# Patient Record
Sex: FEMALE | Race: WHITE | Employment: UNEMPLOYED | URBAN - METROPOLITAN AREA
[De-identification: names, ages, dates, MRNs, and addresses within clinical notes are randomized per-mention and may not be internally consistent; named-entity substitution may affect disease eponyms.]

---

## 2020-11-25 ENCOUNTER — HOSPITAL ENCOUNTER (INPATIENT)
Age: 35
LOS: 8 days | Discharge: HOME | DRG: 885 | End: 2020-12-03
Attending: PSYCHIATRY & NEUROLOGY | Admitting: PSYCHIATRY & NEUROLOGY

## 2020-11-25 PROBLEM — F32.9 MDD (MAJOR DEPRESSIVE DISORDER): Status: ACTIVE | Noted: 2020-11-25

## 2020-11-25 PROCEDURE — 74011250636 HC RX REV CODE- 250/636: Performed by: PSYCHIATRY & NEUROLOGY

## 2020-11-25 PROCEDURE — 94660 CPAP INITIATION&MGMT: CPT

## 2020-11-25 PROCEDURE — 74011250637 HC RX REV CODE- 250/637: Performed by: PSYCHIATRY & NEUROLOGY

## 2020-11-25 PROCEDURE — 65220000003 HC RM SEMIPRIVATE PSYCH

## 2020-11-25 PROCEDURE — 74011250637 HC RX REV CODE- 250/637: Performed by: NURSE PRACTITIONER

## 2020-11-25 RX ORDER — OLANZAPINE 10 MG/1
10 TABLET ORAL
COMMUNITY

## 2020-11-25 RX ORDER — TESTOSTERONE CYPIONATE 200 MG/ML
50 INJECTION INTRAMUSCULAR
Status: DISCONTINUED | OUTPATIENT
Start: 2020-11-25 | End: 2020-11-25

## 2020-11-25 RX ORDER — DIVALPROEX SODIUM 500 MG/1
1000 TABLET, EXTENDED RELEASE ORAL
Status: DISCONTINUED | OUTPATIENT
Start: 2020-11-25 | End: 2020-11-25

## 2020-11-25 RX ORDER — DICYCLOMINE HYDROCHLORIDE 10 MG/1
10 CAPSULE ORAL 4 TIMES DAILY
COMMUNITY
End: 2020-12-03

## 2020-11-25 RX ORDER — TRAZODONE HYDROCHLORIDE 50 MG/1
50 TABLET ORAL
Status: DISCONTINUED | OUTPATIENT
Start: 2020-11-25 | End: 2020-12-03 | Stop reason: HOSPADM

## 2020-11-25 RX ORDER — TIZANIDINE 2 MG/1
2 TABLET ORAL 3 TIMES DAILY
COMMUNITY
End: 2020-12-03

## 2020-11-25 RX ORDER — BENZTROPINE MESYLATE 1 MG/1
1 TABLET ORAL
Status: DISCONTINUED | OUTPATIENT
Start: 2020-11-25 | End: 2020-12-03 | Stop reason: HOSPADM

## 2020-11-25 RX ORDER — METOPROLOL TARTRATE 25 MG/1
25 TABLET, FILM COATED ORAL 2 TIMES DAILY
Status: DISCONTINUED | OUTPATIENT
Start: 2020-11-25 | End: 2020-12-01

## 2020-11-25 RX ORDER — DIPHENHYDRAMINE HYDROCHLORIDE 50 MG/ML
50 INJECTION, SOLUTION INTRAMUSCULAR; INTRAVENOUS
Status: DISCONTINUED | OUTPATIENT
Start: 2020-11-25 | End: 2020-12-03 | Stop reason: HOSPADM

## 2020-11-25 RX ORDER — ALBUTEROL SULFATE 90 UG/1
2 AEROSOL, METERED RESPIRATORY (INHALATION)
COMMUNITY

## 2020-11-25 RX ORDER — HYDROXYZINE 50 MG/1
50 TABLET, FILM COATED ORAL
Status: ON HOLD | COMMUNITY
End: 2020-12-03 | Stop reason: SDUPTHER

## 2020-11-25 RX ORDER — ALBUTEROL SULFATE 90 UG/1
2 AEROSOL, METERED RESPIRATORY (INHALATION)
Status: DISCONTINUED | OUTPATIENT
Start: 2020-11-25 | End: 2020-12-03 | Stop reason: HOSPADM

## 2020-11-25 RX ORDER — PANTOPRAZOLE SODIUM 40 MG/1
40 TABLET, DELAYED RELEASE ORAL DAILY
Status: DISCONTINUED | OUTPATIENT
Start: 2020-11-25 | End: 2020-12-03 | Stop reason: HOSPADM

## 2020-11-25 RX ORDER — MOMETASONE FUROATE 1 MG/G
CREAM TOPICAL DAILY
COMMUNITY
End: 2020-12-03

## 2020-11-25 RX ORDER — DIVALPROEX SODIUM 500 MG/1
500 TABLET, DELAYED RELEASE ORAL 2 TIMES DAILY
COMMUNITY
Start: 2020-11-04 | End: 2020-12-03

## 2020-11-25 RX ORDER — DIVALPROEX SODIUM 500 MG/1
2000 TABLET, EXTENDED RELEASE ORAL
Status: DISCONTINUED | OUTPATIENT
Start: 2020-11-25 | End: 2020-11-30

## 2020-11-25 RX ORDER — TESTOSTERONE CYPIONATE 200 MG/ML
100 INJECTION INTRAMUSCULAR
Status: DISCONTINUED | OUTPATIENT
Start: 2020-11-25 | End: 2020-12-03 | Stop reason: HOSPADM

## 2020-11-25 RX ORDER — TESTOSTERONE CYPIONATE 200 MG/ML
100 INJECTION INTRAMUSCULAR
COMMUNITY

## 2020-11-25 RX ORDER — FLUTICASONE PROPIONATE 50 MCG
2 SPRAY, SUSPENSION (ML) NASAL DAILY
COMMUNITY

## 2020-11-25 RX ORDER — ACETAMINOPHEN 325 MG/1
650 TABLET ORAL
Status: DISCONTINUED | OUTPATIENT
Start: 2020-11-25 | End: 2020-12-03 | Stop reason: HOSPADM

## 2020-11-25 RX ORDER — LORAZEPAM 2 MG/ML
1 INJECTION INTRAMUSCULAR
Status: DISCONTINUED | OUTPATIENT
Start: 2020-11-25 | End: 2020-12-03 | Stop reason: HOSPADM

## 2020-11-25 RX ORDER — HYDROXYZINE 50 MG/1
50 TABLET, FILM COATED ORAL
Status: DISCONTINUED | OUTPATIENT
Start: 2020-11-25 | End: 2020-12-03 | Stop reason: HOSPADM

## 2020-11-25 RX ORDER — FLUTICASONE PROPIONATE 50 MCG
2 SPRAY, SUSPENSION (ML) NASAL DAILY
Status: DISCONTINUED | OUTPATIENT
Start: 2020-11-25 | End: 2020-12-03 | Stop reason: HOSPADM

## 2020-11-25 RX ORDER — PANTOPRAZOLE SODIUM 40 MG/1
40 TABLET, DELAYED RELEASE ORAL DAILY
COMMUNITY

## 2020-11-25 RX ORDER — ADHESIVE BANDAGE
30 BANDAGE TOPICAL DAILY PRN
Status: DISCONTINUED | OUTPATIENT
Start: 2020-11-25 | End: 2020-12-03 | Stop reason: HOSPADM

## 2020-11-25 RX ORDER — OLANZAPINE 5 MG/1
5 TABLET ORAL
Status: DISCONTINUED | OUTPATIENT
Start: 2020-11-25 | End: 2020-11-28

## 2020-11-25 RX ORDER — METOPROLOL TARTRATE 25 MG/1
25 TABLET, FILM COATED ORAL 2 TIMES DAILY
COMMUNITY

## 2020-11-25 RX ADMIN — TESTOSTERONE CYPIONATE 100 MG: 200 INJECTION, SOLUTION INTRAMUSCULAR at 13:57

## 2020-11-25 RX ADMIN — METOPROLOL TARTRATE 25 MG: 25 TABLET, FILM COATED ORAL at 17:56

## 2020-11-25 RX ADMIN — METOPROLOL TARTRATE 25 MG: 25 TABLET, FILM COATED ORAL at 10:48

## 2020-11-25 RX ADMIN — ACETAMINOPHEN 650 MG: 325 TABLET ORAL at 19:44

## 2020-11-25 RX ADMIN — PANTOPRAZOLE SODIUM 40 MG: 40 TABLET, DELAYED RELEASE ORAL at 10:49

## 2020-11-25 RX ADMIN — FLUTICASONE PROPIONATE 2 SPRAY: 50 SPRAY, METERED NASAL at 12:44

## 2020-11-25 RX ADMIN — DIVALPROEX SODIUM 2000 MG: 500 TABLET, EXTENDED RELEASE ORAL at 21:32

## 2020-11-25 NOTE — PROGRESS NOTES
Problem: Discharge Planning  Goal: *Discharge to safe environment  Outcome: Progressing Towards Goal  Note: Pt will discharge home when stable and follow-up with [facility]  Goal: *Knowledge of medication management  Outcome: Progressing Towards Goal  Note: Pt acknowledges understanding of all medications prescribed  Goal: *Knowledge of discharge instructions  Outcome: Progressing Towards Goal  Note: Pt acknowledges he will discharge home and follow-up with [facility]

## 2020-11-25 NOTE — BH NOTES
Pt received voluntarily via stretcher from [facility] ED to general unit  Pt admits to passive suicidal thoughts with no intent (hx of cutting, ODing, hanging). Pt denies HI and any psychotic symptoms. UDS +THC (denies all other drug use)  No ETOH or tobacco use noted. Skin assessment completed by Tonja Villatoro, ALBERT and Kirk Andre RN. Multiple scars on upper extremities from cutting. Scars on back and front from multiple surgeries. Multiple tattoos on body.       Blocked Bed Documentation:  Room number: 733  Type: Behavior  Rationale: Transgender  Anticipated duration: entire duration

## 2020-11-25 NOTE — PROGRESS NOTES
[facility] 60  Master Treatment Plan for Sho Hazard    Date Treatment Plan Initiated: 11/25/20    Treatment Plan Modalities:  Type of Modality Amount  (x minutes) Frequency (x/week) Duration (x days) Name of Responsible Staff   [address] [facility] meetings to encourage peer interactions 13 7 1 Jacquelin CABAN     Group psychotherapy to assist in building coping skills and internal controls 60 7 1 Nora Hernandez   Therapeutic activity groups to build coping skills 60 7 1 Nora Hernandez   Psychoeducation in group setting to address:   Medication education   15 7 [address] [employer] skills   30 3 1 Nora Hernandez   Relaxation techniques         Symptom management         Discharge planning   60 2 [address] [employer]    60 2 1 Chaplain CASTANEDA   61 1 1 volunteer   Recovery/AA/NA      volunteer   Physician medication management   13 7 1 Dr. Yesenia Muller meeting/discharge planning   15 2 1 Archie Woodward and Lucinda Meadows                                   Problem: Depressed Mood (Adult/Pediatric)  Goal: *STG: Participates in treatment plan  Outcome: Progressing Towards Goal  Note: Out on unit passively engaged. Mood and affect sad to anxious. Denies  SI with a plan describes feeling hopeless. Racing thoughts and chronic thoughts of cutting and self harming. Education provided on risks and drawback to daily thc. Medications reviewed and pt verbalized understanding. Social stressors and increase stress level related to his ex relationship.  Staff focus is on offering coping skills and medication education  Goal: *STG: Verbalizes anger, guilt, and other feelings in a constructive manor  Outcome: Progressing Towards Goal  Goal: *STG: Attends activities and groups  Outcome: Progressing Towards Goal  Goal: *STG: Demonstrates reduction in symptoms and increase in insight into coping skills/future focused  Outcome: Progressing Towards Goal  Goal: Interventions  Outcome: Progressing Towards Goal

## 2020-11-25 NOTE — BH NOTES
GROUP THERAPY PROGRESS NOTE    Patient is participating in Process Group. Group time: 50 minutes    Personal goal for participation: Removing negative thoughts from your life and exploring positive things about oneself     Goal orientation: Personal    Group therapy participation: active    Therapeutic interventions reviewed and discussed: Group discussion around negative thoughts and how to remove them from an individuals life. Group members were given a piece of paper that they cut up into sections. On these pieces, patients wrote down negative thoughts or negative things people have told them. They then ripped the sheet of paper and threw it in the trash and explained why that isnt true. Group discussion involved other ways to remove these thoughts: distraction, expressing gratitude, labeling your thoughts, and changing your relationship with your brain. Then, everyone in group was asked to complete the activity Toot your own horn by filling in and completing various sentences about themselves in a positive way. Group discussion around challenges completing this activity and different thoughts that patients pondered upon while doing so. Each member shared their activity, if they wanted to, and discussed things they learned about themselves or where there is room for growth. Impression of participation: Gill Dash actively participated in group. He shared his negative thought is about relationships. Pt was encouraged to list 3 positive things that make him a good partner. He processed this with the group. He was supportive of other group members.      Nora Hernandez, Supervisee in Social Work

## 2020-11-25 NOTE — BH NOTES
TRANSFER - IN REPORT:    Verbal report received from [facility] received from [facility] ED for routine progression of care      Report consisted of patients Situation, Background, Assessment and   Recommendations(SBAR). Information from the following report(s) SBAR was reviewed with the receiving nurse. Opportunity for questions and clarification was provided. Assessment completed upon patients arrival to unit and care assumed.

## 2020-11-25 NOTE — BH NOTES
PSYCHOSOCIAL ASSESSMENT  :Patient identifying info:  Lisa Babin is a 28 y.o., male admitted 11/25/2020  2:52 AM     Presenting problem and precipitating factors: Pt is a 31yo, transgender male who was transferred from [facility] endorsing SI and thoughts of self-harm by cutting. He has a long history of cutting, but says he has not done that for the past 2 weeks. States that he has been sleeping poorly, has had little energy and motivation and thinks about suicide almost every day. He reports being discharged from [facility] a few months ago, before he felt he was ready, resulting in his decompensation. Denies use of alcohol, but reports using marijuana most days of the week estimating about a gram per day, for years. He states he broke up with a girlfriend in 02/2020, after which COVID started and he has felt stressed ever since. He also noted that she has been harassing him recently with multiple phone calls, and this has made him feel worse. He is followed by [facility]; Dr. Maria De Jesus Rothman for med mgmt and Zee Barrigafor counseling. Mental status assessment: depressed mood and affect, racing thoughts, poor insight, limited coping skills     Strengths:  Stable housing  Support team at [facility]    Collateral information:   Pt does not want friends or family contacted     Current psychiatric /substance abuse providers and contact info:   [facility]; Dr. Maria De Jesus Rothman for med mgmt and Emilee Mckeon for counseling.       Previous psychiatric/substance abuse providers and response to treatment:     Family history of mental illness or substance abuse:     Substance abuse history:    Social History     Tobacco Use    Smoking status: Former Smoker    Smokeless tobacco: Never Used   Substance Use Topics    Alcohol use: Not Currently       History of biomedical complications associated with substance abuse :    Patient's current acceptance of treatment or motivation for change:    Family constellation:     Is significant other involved?    No    Describe support system:   [address]    Describe living arrangements and home environment:  Lives with roommate    Health issues:   Hospital Problems  Never Reviewed          Codes Class Noted POA    MDD (major depressive disorder) ICD-10-CM: F32.9  ICD-9-CM: 296.20  2020 Unknown              Trauma history:   Extensive childhood and adult    Legal issues:   No    History of  service:   No    Financial status:  Medicare    Restorationist/cultural factors:     Education/work history:     Have you been licensed as a health care professional (current or ):   No    Leisure and recreation preferences:     Describe coping skills:  Limited coping skills    Leslie Mcmahan  2020

## 2020-11-25 NOTE — BH NOTES
GROUP THERAPY PROGRESS NOTE    Patient is participating in Art/Gratitude Group     Group time: 50 minutes    Personal goal for participation: make a thanksgiving card and create gratitude list     Goal orientation: Personal    Group therapy participation: active    Therapeutic interventions reviewed and discussed: Group members participated in art therapy. They created a Thanksgiving card and decorated it. Each member then stated at least 5 things they are thankful for. The goal is to reconnect with the experience and reflect on the feelings of gratitude and pleasure that they experienced. Impression of participation: Vicente Garcia actively participated in group. He was eating his breakfast, so he did not complete a Thanksigivng card. But patient did write down 5 things he was thankful for on a piece of paper.      Nora Hernandez, Supervisee in Social Work

## 2020-11-25 NOTE — BH NOTES
Behavioral Health Interdisciplinary Rounds     Patient Name: Bradford Keyes  Age: 28 y.o. Room/Bed:  733/  Primary Diagnosis: <principal problem not specified>   Admission Status: Voluntary     Readmission within 30 days: no  Power of  in place: no  Patient requires a blocked bed: yes          Reason for blocked bed:     VTE Prophylaxis: No    Mobility needs/Fall risk: no  Flu Vaccine :    Nutritional Plan:   Consults:          Labs/Testing due today?:     Sleep hours:        Participation in Care/Groups:    Medication Compliant?: Yes  PRNS (last 24 hours):     Restraints (last 24 hours):  no     CIWA (range last 24 hours):     COWS (range last 24 hours):      Alcohol screening (AUDIT) completed -   AUDIT Score: 2     If applicable, date SBIRT discussed in treatment team AND documented:   AUDIT Screen Score: AUDIT Score: 2      Document Brief Intervention (corresponds directly with the 5 A's, Ask, Advise, Assess, Assist, and Arrange): At- Risk Patients (Score 7-15 for women; 8-15 for men)  Discuss concern patient is drinking at unhealthy levels known to increase risk of alcohol-related health problems. Is Patient ready to commit to change? If No:   Encourage reflection   Discuss short term and long term health risks of consuming alcohol   Barriers to change   Reaffirm willingness to help / Educational materials provided  If Yes:   Set goal  Armor5 provided    Harmful use or Dependence (Score 16 or greater)   Discuss short term and long term health risks of consuming alcohol   Recommendations   Negotiate drinking goal   Recommend addiction specialist/center   Arrange follow-up appointments.     Tobacco - patient is a smoker: Have You Used Tobacco in the Past 30 Days: No  Illegal Drugs use: Have You Used Any Illegal Substances Over the Past 12 Months: Yes    24 hour chart check complete:      Patient goal(s) for today: Attend groups   Treatment team focus/goals: medication mgmt and safe discharge  Progress note:  Pt reports feeling hopeless and overwhelmed due to covid, bad breakup, toxic relationships. He reports a hx of cutting with recent self-harm 2 weeks ago. He is followed by [facility]; Dr. South Bearden for med mgmt and [facility] for counseling.       LOS:  0  Expected LOS:    Financial concerns/prescription coverage:  No (medicare)  Family contact: No contact      Family requesting physician contact today:  no  Discharge plan: Discharge home and follow-up with [facility]  Access to weapons : no        Outpatient provider(s): [facility]  Patient's preferred phone number for follow up call : [phone] (Home)  Patient's preferred e-mail address :  Participating treatment team members: TROY Duncan; Dr. Joie Alfonso; Edmund Montgomery RN

## 2020-11-25 NOTE — H&P
[address] [facility] HISTORY AND PHYSICAL    Name:  Rudy Dudley  MR#:  [id number]  :  1985  ACCOUNT #:  [de-identified]  ADMIT DATE:  2020    INITIAL PSYCHIATRIC INTERVIEW    CHIEF COMPLAINT:  \"I was feeling very suicidal and hopeless. \"    HISTORY OF PRESENT ILLNESS:  The patient is a 27-year-old male who is transgender female to male and is currently admitted with a history of being transferred to us from [facility].  He had been brought there with a history of having expressed thoughts of suicide and cutting himself. Reports that he was just discharged from [facility] a few months ago, but had never gotten better post discharge. States that he has been sleeping poorly, has had little energy and motivation and thinks about suicide almost every day. Since his thoughts were getting worse, he decided to come to the hospital for help. Denies use of alcohol, but reports that he uses marijuana most days of the week and estimates that he uses about a gram per day, which he has been doing for many years. States that he has been compliant with most of his medications, but felt that they are really not helping him. He states that he broke up with a girlfriend in 2020, after which COVID started and he has felt stressed ever since. He also noted that she has been harassing him recently with multiple phone calls, and this has made him feel worse. He has a long history of cutting himself, but says he has not done that for the past 2 weeks. Most recently, he was supposed to see somebody at the [facility] and has been compliant in following up there. He denies any psychotic symptoms at the present time.     PAST MEDICAL HISTORY:  Reviewed as per the history and physical exam.      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 Authorizing Provider   atorvastatin (LIPITOR) 40 mg tablet Take 1 Tab by mouth nightly. Indications: high cholesterol and high triglycerides 11/28/20  Yes Nilo Gunderson NP   tiZANidine (ZANAFLEX) 2 mg tablet Take 2 mg by mouth three (3) times daily. Indications: muscle spasm   Yes Provider, Historical   divalproex DR (DEPAKOTE) 500 mg tablet Take 500 mg by mouth two (2) times a day. Indications: bipolar disorder in remission 11/4/20  Yes Provider, Historical   pantoprazole (PROTONIX) 40 mg tablet Take 40 mg by mouth daily. Indications: gastroesophageal reflux disease   Yes Provider, Historical   OLANZapine (ZyPREXA) 10 mg tablet Take 10 mg by mouth every twelve (12) hours as needed. Indications: severe agitation secondary to mood/psychotic disorder   Yes Provider, Historical   hydrOXYzine HCL (ATARAX) 50 mg tablet Take 50 mg by mouth three (3) times daily as needed for Anxiety. Yes Provider, Historical   dicyclomine (BENTYL) 10 mg capsule Take 10 mg by mouth four (4) times daily. Indications: irritable colon   Yes Provider, Historical   metoprolol tartrate (LOPRESSOR) 25 mg tablet Take 25 mg by mouth two (2) times a day. Indications: high blood pressure   Yes Provider, Historical   albuterol (ProAir HFA) 90 mcg/actuation inhaler Take 2 Puffs by inhalation every four (4) hours as needed for Wheezing. Yes Provider, Historical   fluticasone propionate (FLONASE) 50 mcg/actuation nasal spray 2 Sprays by Both Nostrils route daily. Indications: allergic conjunctivitis   Yes Provider, Historical   testosterone cypionate (DEPOTESTOTERONE CYPIONATE) 200 mg/mL injection 100 mg. Subcutaneously every Wednesday   Yes Provider, Historical   mometasone (ELOCON) 0.1 % topical cream Apply  to affected area daily.    Yes Provider, Historical Vitals:    11/30/20 2008 12/01/20 0810 12/01/20 1541 12/01/20 2015   BP: (!) 133/90 (!) 167/77 (!) 137/91 135/70   Pulse: 75 92 74 73   Resp: 16 18 14 12   Temp: 98 °F (36.7 °C) 97.6 °F (36.4 °C) 97.3 °F (36.3 °C) 97.9 °F (36.6 °C)   SpO2:   94% 95%   Weight:       Height:         Lab Results   Component Value Date/Time    WBC 5.7 05/13/2009 07:30 PM    HGB 15.7 05/13/2009 07:30 PM    HCT 44.4 05/13/2009 07:30 PM    PLATELET 826 84/24/7672 07:30 PM    MCV 88.8 05/13/2009 07:30 PM     Lab Results   Component Value Date/Time    Sodium 142 12/02/2020 06:00 AM    Potassium 3.7 12/02/2020 06:00 AM    Chloride 106 12/02/2020 06:00 AM    CO2 31 12/02/2020 06:00 AM    Anion gap 5 12/02/2020 06:00 AM    Glucose 84 12/02/2020 06:00 AM    BUN 8 12/02/2020 06:00 AM    Creatinine 0.66 (L) 12/02/2020 06:00 AM    BUN/Creatinine ratio 12 12/02/2020 06:00 AM    GFR est AA >60 12/02/2020 06:00 AM    GFR est non-AA >60 12/02/2020 06:00 AM    Calcium 8.9 12/02/2020 06:00 AM    Bilirubin, total 0.3 05/13/2009 07:30 PM    Alk. phosphatase 63 05/13/2009 07:30 PM    Protein, total 7.0 05/13/2009 07:30 PM    Albumin 4.0 05/13/2009 07:30 PM    Globulin 3.0 05/13/2009 07:30 PM    A-G Ratio 1.3 05/13/2009 07:30 PM    ALT (SGPT) 42 05/13/2009 07:30 PM    AST (SGOT) 7 (L) 05/13/2009 07:30 PM     Lab Results   Component Value Date/Time    Valproic acid 125 (H) 11/29/2020 05:49 PM     Lab Results   Component Value Date/Time    Lithium level 0.44 (L) 12/01/2020 06:14 AM     RADIOLOGY REPORTS:(reviewed/updated 12/2/2020)  No results found. PAST PSYCHIATRIC HISTORY:  The patient reports that he was first hospitalized at the age of 15 and has had numerous psychiatric hospitalizations since then. As noted above, he was most recently hospitalized at the [facility] 4 months ago, but feels that stay was not really beneficial to him.   Currently, he sees Dr. Jazzy Luo at the [facility] and also has a therapist whom he sees on a regular basis. The patient was hospitalized in 2009 at [facility] for major depression and had been treated by Dr. Saloni Casey at that time. Currently, the patient is going through the transition from female to male and has had several surgeries as well as being on testosterone injections. PSYCHOSOCIAL HISTORY:  He lives in [place], [place], where he lives alone. He has never been  and does not have any children and is currently single. The patient gets social security disability for his current psychiatric diagnosis and does not have an income otherwise. States that he has no contact with his parents and only keeps in touch with his grandparents who live in [address] [place] [address]. He was born and brought up in [address] [place] [address] for the most part. Denies any major legal or financial stressors. MENTAL STATUS EXAM:  The patient is a young male who is dressed in casual street clothes. He is calm and cooperative during the interview and makes good eye contact. Speech is spontaneous and coherent. Mood is reported as being down, and affect is depressed. Psychomotor activity is within normal limits. Passive suicidal ideation is present but feels safe in the hospital.  Denies any perceptual abnormalities. Denies any delusions. His thought process is logical and goal-directed. Cognitively, he is awake and alert, oriented to time, place and person. Intelligence is average, memory is intact, and fund of knowledge is adequate. Insight is poor. Judgment is poor. ASSESSMENT AND PLAN/DIAGNOSES:  Bipolar I disorder, most recent episode depression, without psychotic symptoms. Borderline personality disorder. History of obsessive-compulsive disorder. At the present time, I will continue his inpatient stay. He will be provided with support and attend groups. Estimated length of stay is 5-7 days.   His strengths include his ability to seek help and support from his therapeutic providers and grandparents.       MD OSCAR Claudio/[id number]  D:  11/25/2020 12:28  T:  11/25/2020 15:28  JOB #:  [id number]

## 2020-11-26 LAB
T4 FREE SERPL-MCNC: 1.2 NG/DL (ref 0.8–1.5)
TSH SERPL DL<=0.05 MIU/L-ACNC: 1.53 UIU/ML (ref 0.36–3.74)

## 2020-11-26 PROCEDURE — 74011250637 HC RX REV CODE- 250/637: Performed by: PSYCHIATRY & NEUROLOGY

## 2020-11-26 PROCEDURE — 84443 ASSAY THYROID STIM HORMONE: CPT

## 2020-11-26 PROCEDURE — 74011250637 HC RX REV CODE- 250/637: Performed by: NURSE PRACTITIONER

## 2020-11-26 PROCEDURE — 36415 COLL VENOUS BLD VENIPUNCTURE: CPT

## 2020-11-26 PROCEDURE — 65220000003 HC RM SEMIPRIVATE PSYCH

## 2020-11-26 PROCEDURE — 84439 ASSAY OF FREE THYROXINE: CPT

## 2020-11-26 RX ORDER — DICYCLOMINE HYDROCHLORIDE 10 MG/1
10 CAPSULE ORAL 4 TIMES DAILY
Status: DISCONTINUED | OUTPATIENT
Start: 2020-11-26 | End: 2020-11-30

## 2020-11-26 RX ORDER — HYDRALAZINE HYDROCHLORIDE 25 MG/1
25 TABLET, FILM COATED ORAL
Status: DISCONTINUED | OUTPATIENT
Start: 2020-11-26 | End: 2020-12-03 | Stop reason: HOSPADM

## 2020-11-26 RX ADMIN — METOPROLOL TARTRATE 25 MG: 25 TABLET, FILM COATED ORAL at 17:26

## 2020-11-26 RX ADMIN — HYDROXYZINE HYDROCHLORIDE 50 MG: 50 TABLET, FILM COATED ORAL at 18:15

## 2020-11-26 RX ADMIN — ACETAMINOPHEN 650 MG: 325 TABLET ORAL at 20:02

## 2020-11-26 RX ADMIN — DIVALPROEX SODIUM 2000 MG: 500 TABLET, EXTENDED RELEASE ORAL at 21:24

## 2020-11-26 RX ADMIN — DICYCLOMINE HYDROCHLORIDE 10 MG: 10 CAPSULE ORAL at 21:26

## 2020-11-26 RX ADMIN — DICYCLOMINE HYDROCHLORIDE 10 MG: 10 CAPSULE ORAL at 17:25

## 2020-11-26 RX ADMIN — FLUTICASONE PROPIONATE 2 SPRAY: 50 SPRAY, METERED NASAL at 08:15

## 2020-11-26 RX ADMIN — METOPROLOL TARTRATE 25 MG: 25 TABLET, FILM COATED ORAL at 08:15

## 2020-11-26 RX ADMIN — PANTOPRAZOLE SODIUM 40 MG: 40 TABLET, DELAYED RELEASE ORAL at 08:16

## 2020-11-26 NOTE — PROGRESS NOTES
Problem: Depressed Mood (Adult/Pediatric)  Goal: *STG: Verbalizes anger, guilt, and other feelings in a constructive manor  Outcome: Progressing Towards Goal    Visible on the unit. Compliant with meals and medications. Pt denies SI. Pt informed writer he has not seen a medical doctor and he would like to use his CPAP he brought from home. 1918: Hospitalist Consult:       Reason for consult: H&P    MD who received call: Paged Dr. Moo Spaulding: pending     1945: Engineering paged. 1955: Hospitalist paged for H&P.   2008: Dr. Brittney Jurado has been notified. 2154: RT notified pt needs CPAP set up.   2254: Grecia Yrn with RT she has been notified pt needs his CPAP set up.    2259: Paged Engineering. 2300: Waldo from Engineering informed writer they do not look at [employer] does. 2302: [facility]. Someone will call with an ETA and look at pt's home CPAP machine.    Gilma Cardoso #[id number]

## 2020-11-26 NOTE — PROGRESS NOTES
Problem: Depressed Mood (Adult/Pediatric)  Goal: *STG: Verbalizes anger, guilt, and other feelings in a constructive manor  Outcome: Progressing Towards Goal     Received pt in bed resting. Pt appears to be in no distress. Respirations even and unlabored. Continuing to monitor pt with q15 min safety rounds.

## 2020-11-26 NOTE — INTERDISCIPLINARY ROUNDS
Behavioral Health Interdisciplinary Rounds Patient Name: Derick Jensen  Age: 28 y.o. Room/Bed:  733/ Primary Diagnosis: <principal problem not specified> Admission Status: Voluntary Readmission within 30 days: no 
Power of  in place: no 
Patient requires a blocked bed: no          Reason for blocked bed: VTE Prophylaxis: No 
 
Mobility needs/Fall risk: no 
Flu Vaccine : No (ordered prior to discharge) Nutritional Plan: no 
Consults: H&P Labs/Testing due today?: yes Sleep hours: 6 Participation in Care/Groups:  yes Medication Compliant?: Yes PRNS (last 24 hours): Pain Restraints (last 24 hours):  no 
  
CIWA (range last 24 hours): COWS (range last 24 hours): Alcohol screening (AUDIT) completed -   AUDIT Score: 2 If applicable, date SBIRT discussed in treatment team AND documented:  
AUDIT Screen Score: AUDIT Score: 2 Document Brief Intervention (corresponds directly with the 5 A's, Ask, Advise, Assess, Assist, and Arrange): At- Risk Patients (Score 7-15 for women; 8-15 for men) Discuss concern patient is drinking at unhealthy levels known to increase risk of alcohol-related health problems. Is Patient ready to commit to change? If No: 
? Encourage reflection ? Discuss short term and long term health risks of consuming alcohol ? Barriers to change ? Reaffirm willingness to help / Educational materials provided If Yes: 
? Set goal 
? Plan 
? Educational materials provided Harmful use or Dependence (Score 16 or greater) ? Discuss short term and long term health risks of consuming alcohol ? Recommendations ? Negotiate drinking goal 
? Recommend addiction specialist/center ? Arrange follow-up appointments. Tobacco - patient is a smoker: Have You Used Tobacco in the Past 30 Days: No 
Illegal Drugs use: Have You Used Any Illegal Substances Over the Past 12 Months: Yes 
 
24 hour chart check complete: yes Patient goal(s) for today:  
Treatment team focus/goals:  
Progress note LOS:  1  Expected LOS:  
 
Financial concerns/prescription coverage:   
Family contact:     
Family requesting physician contact today:   
Discharge plan: Access to weapons :        
Outpatient provider(s): 
Patient's preferred phone number for follow up call Patient's preferred e-mail address : 
Participating treatment team members: Sonya Rhodes, * (assigned SW),

## 2020-11-26 NOTE — PROGRESS NOTES
Psychiatric Progress Note    Patient: Judy Sutherland MRN: [id number]  SSN: [id number]    YOB: 1985  Age: 28 y.o. Sex: male      Admit Date: 11/25/2020    LOS: 1 day     Subjective:     Judy Staff  reports feeling somewhat irritated this morning due to behaviors of another patient. He also stressed concerns about the timeliness of availability of his CPAP device. Apparently there was some confusion about who was responsible for inspecting his home device and these individuals have been suboptimally responsive. He denies any side effects of medications. Overall, pleasant and cooperative on interview.     Objective:     Vitals:    11/25/20 1551 11/25/20 2029 11/26/20 0815 11/26/20 1536   BP: (!) 135/103 (!) 139/104 (!) 138/98 134/89   Pulse: 77 71 85 76   Resp: 16 16 16 16   Temp: 98 °F (36.7 °C) 97.8 °F (36.6 °C) 97.4 °F (36.3 °C) 97.7 °F (36.5 °C)   SpO2: 98% 98% 99% 96%   Height:            Mental Status Exam:   Level of alertness: alert  Orientation: awake, alert, oriented X4  Appearance: appropriate  Mood: \"okay\"  Affect: mildly constricted, anxious  Psychomotor state: anxious  Attitude: open  SI/HI: denies  Sleep concerns: none  Speech: normal rate & rhythm  Language: logical, goal directed  Thought processes: coherent & goal directed  Associations: intact  Thought content: no delusions elicited  Hallucinations: denies  Attention: adequate  Concentration: adequate  Intellect: normal  Fund of knowledge: good  Insight/Judgment: insight intact, judgment intact  Musculoskeletal: normal inspection  Gait: normal      MEDICATIONS:  Current Facility-Administered Medications   Medication Dose Route Frequency    dicyclomine (BENTYL) capsule 10 mg  10 mg Oral QID    selenium sulfide (SELSUN BLUE) 1 % shampoo   Topical PRN    OLANZapine (ZyPREXA) tablet 5 mg  5 mg Oral Q6H PRN    benztropine (COGENTIN) tablet 1 mg  1 mg Oral BID PRN    diphenhydrAMINE (BENADRYL) injection 50 mg  50 mg IntraMUSCular BID PRN    hydrOXYzine HCL (ATARAX) tablet 50 mg  50 mg Oral TID PRN    LORazepam (ATIVAN) injection 1 mg  1 mg IntraMUSCular Q4H PRN    traZODone (DESYREL) tablet 50 mg  50 mg Oral QHS PRN    acetaminophen (TYLENOL) tablet 650 mg  650 mg Oral Q4H PRN    magnesium hydroxide (MILK OF MAGNESIA) 400 mg/5 mL oral suspension 30 mL  30 mL Oral DAILY PRN    OLANZapine (ZyPREXA) 5 mg in sterile water (preservative free) 1 mL injection  5 mg IntraMUSCular Q6H PRN    influenza vaccine 2020-21 (6 mos+)(PF) (FLUARIX/FLULAVAL/FLUZONE QUAD) injection 0.5 mL  0.5 mL IntraMUSCular PRIOR TO DISCHARGE    pantoprazole (PROTONIX) tablet 40 mg  40 mg Oral DAILY    metoprolol tartrate (LOPRESSOR) tablet 25 mg  25 mg Oral BID    fluticasone propionate (FLONASE) 50 mcg/actuation nasal spray 2 Spray  2 Spray Both Nostrils DAILY    albuterol (PROVENTIL HFA, VENTOLIN HFA, PROAIR HFA) inhaler 2 Puff  2 Puff Inhalation Q4H PRN    divalproex ER (DEPAKOTE ER) 24 hour tablet 2,000 mg  2,000 mg Oral QHS    testosterone cypionate (DEPOTESTOTERONE CYPIONATE) injection 100 mg  100 mg Other every Wednesday        Assessment:     Active Problems:    MDD (major depressive disorder) (11/25/2020)        Plan:     Continue current care  Restart bentyl prn per pt request  Disposition planning with social work    Signed By: Candis Goldsmith MD     November 26, 2020

## 2020-11-26 NOTE — PROGRESS NOTES
Problem: Depressed Mood (Adult/Pediatric)  Goal: *STG: Participates in treatment plan  Outcome: Progressing Towards Goal  Note: Out on unit engaged with a full range affect mood stable and slightly more hopeful. Denies SI, daily goal is to talk with friends on phone and keep self distracted.  Staff focus is on d/c planning   Goal: *STG: Verbalizes anger, guilt, and other feelings in a constructive manor  Outcome: Progressing Towards Goal  Goal: *STG: Attends activities and groups  Outcome: Progressing Towards Goal  Goal: *STG: Demonstrates reduction in symptoms and increase in insight into coping skills/future focused  Outcome: Progressing Towards Goal  Goal: Interventions  Outcome: Progressing Towards Goal

## 2020-11-26 NOTE — BH NOTES
PRN Medication Documentation    Specific patient behavior that led to need for PRN medication: elevated b/p,anxious,hyperv erbal  Staff interventions attempted prior to PRN being given: redirection,coping PRN medication given: atarax   Patient response/effectiveness of PRN medication: tl aware Dr. Lane in examining pt this am and he was notified of pts c/o

## 2020-11-26 NOTE — CONSULTS
Hospitalist H&P Note         Boubacar Rodrigues NP  [phone] or TigerText  Call physician on-call through the  7pm-7am    Primary Care Provider: UNKNOWN  Source of Information: Patient, chart    History of Presenting Illness:   Rickey Marshall is a 28 y.o. 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 Patient is seen in [facility]. Is concerned about making sure his CPAP is set up in a reasonable time for him to go to sleep as he gets apparently a large dose of Depakote that makes him drowsy. He denies HA, dizziness, visual changes, cough, SOB, CP, abd pain, n/v/d, dysuria or weakness. Review of paper chart shows abnormal UA concerning for UTI, UDS positive for THC, elevated glucose of 128. 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 No pertinent surgical history. Prior to Admission medications    Medication Sig Start Date End Date Taking? Authorizing Provider   tiZANidine (ZANAFLEX) 2 mg tablet Take 2 mg by mouth three (3) times daily. Indications: muscle spasm   Yes Provider, Historical   divalproex DR (DEPAKOTE) 500 mg tablet Take 500 mg by mouth two (2) times a day. Indications: bipolar disorder in remission 11/4/20  Yes Provider, Historical   pantoprazole (PROTONIX) 40 mg tablet Take 40 mg by mouth daily. Indications: gastroesophageal reflux disease   Yes Provider, Historical   OLANZapine (ZyPREXA) 10 mg tablet Take 10 mg by mouth every twelve (12) hours as needed.  Indications: severe agitation secondary to mood/psychotic disorder   Yes Provider, Historical   hydrOXYzine HCL (ATARAX) 50 mg tablet Take 50 mg by mouth three (3) times daily as needed for Anxiety. Yes Provider, Historical   dicyclomine (BENTYL) 10 mg capsule Take 10 mg by mouth four (4) times daily. Indications: irritable colon   Yes Provider, Historical   metoprolol tartrate (LOPRESSOR) 25 mg tablet Take 25 mg by mouth two (2) times a day. Indications: high blood pressure   Yes Provider, Historical   albuterol (ProAir HFA) 90 mcg/actuation inhaler Take 2 Puffs by inhalation every four (4) hours as needed for Wheezing. Yes Provider, Historical   fluticasone propionate (FLONASE) 50 mcg/actuation nasal spray 2 Sprays by Both Nostrils route daily. Indications: allergic conjunctivitis   Yes Provider, Historical   testosterone cypionate (DEPOTESTOTERONE CYPIONATE) 200 mg/mL injection 100 mg. Subcutaneously every Wednesday   Yes Provider, Historical   mometasone (ELOCON) 0.1 % topical cream Apply  to affected area daily.    Yes Provider, Historical     Allergies   Allergen Reactions    Amoxicillin Unknown (comments)    Geodon [Ziprasidone Hcl] Unknown (comments)    Haloperidol Unknown (comments)    Lamictal [Lamotrigine] Unknown (comments)    Remeron [Mirtazapine] Unknown (comments)    Risperidone Unknown (comments)    Spironolactone Unknown (comments)      Family History   Problem Relation Age of Onset    Stroke Mother     Stroke Maternal Aunt       Extended Emergency Contact Information  Primary Emergency Contact: none,none  Relation: None     SOCIAL HISTORY:  Patient resides:  Independently X   Assisted Living    SNF    With family care       Smoking history:   None    Former X   Chronic      Alcohol history:   None    Social X   Chronic      Substance Abuse history:   No    Yes X   Substance(s):THC      Ambulates:   Independently X   w/cane    w/walker    w/wc      CODE STATUS:  DNR    Full X   Other      Objective:   Physical Exam:   Visit Vitals  /86   Pulse 75   Temp 97.7 °F (36.5 °C)   Resp 16   Ht 5' 4\" (1.626 m)   SpO2 96%      O2 Device: CPAP mask(Simultaneous filing. User may not have seen previous data.)    General:  Alert, cooperative, no distress, appears stated age, obese    HEENT:  Normocephalic, atraumatic. Conjunctivae/corneas clear. PERRL, EOMs intact. Nares nl. Septum midline. Mucosa nl. No drainage or sinus tenderness. Lips, mucosa, and tongue nl. Teeth and gums nl. Neck: Supple, symmetrical, trachea midline, no adenopathy, thyroid: no enlargement/tenderness/nodules    Back:   Symmetric, no curvature. ROM nl. No CVA tenderness. Lungs:   Clear to auscultation bilaterally. No Wheezing/Rhonchi/Rales. No SOB, no accessory muscle use    Heart:  Regular rate and rhythm, S1, S2 nl, no murmur, click, rub or gallop. Abdomen:   Soft, non-tender, no distention. Bowel sounds nl. Extremities: Extremities nl, atraumatic, no clubbing, cyanosis or edema. Skin: Skin color, texture, turgor nl. No rashes or lesions. Cap refill <3 sec    Psych: Cooperative, not anxious or agitated. A/O x 3. Neurologic: CNII-XII grossly intact. no focal neurological deficits,  moving all extremities, speech clear      EKG: Not done; ordered    Data Review:   Recent Days:  Recent Results (from the past 24 hour(s))   T4, FREE    Collection Time: 11/26/20  6:31 AM   Result Value Ref Range    T4, Free 1.2 0.8 - 1.5 NG/DL   TSH 3RD GENERATION    Collection Time: 11/26/20  6:31 AM   Result Value Ref Range    TSH 1.53 0.36 - 3.74 uIU/mL      Review of paper chart shows abnormal UA concerning for UTI, UDS positive for THC, elevated glucose of 128.     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 We will follow up on lab results, EKG.     Luis Eduardo Roberts NP  [facility]  Adult Hospitalists    I can be reached via PerfectServe       Signed By: Fauzia Ibanez NP     November 26, 2020

## 2020-11-27 LAB
APPEARANCE UR: CLEAR
BACTERIA URNS QL MICRO: NEGATIVE /HPF
BILIRUB UR QL: NEGATIVE
COLOR UR: ABNORMAL
EPITH CASTS URNS QL MICRO: ABNORMAL /LPF
EST. AVERAGE GLUCOSE BLD GHB EST-MCNC: 111 MG/DL
GLUCOSE UR STRIP.AUTO-MCNC: NEGATIVE MG/DL
HBA1C MFR BLD: 5.5 % (ref 4–5.6)
HGB UR QL STRIP: NEGATIVE
HYALINE CASTS URNS QL MICRO: ABNORMAL /LPF (ref 0–5)
KETONES UR QL STRIP.AUTO: ABNORMAL MG/DL
LEUKOCYTE ESTERASE UR QL STRIP.AUTO: NEGATIVE
NITRITE UR QL STRIP.AUTO: NEGATIVE
PH UR STRIP: 7 [PH] (ref 5–8)
PROT UR STRIP-MCNC: NEGATIVE MG/DL
RBC #/AREA URNS HPF: ABNORMAL /HPF (ref 0–5)
SP GR UR REFRACTOMETRY: 1.02 (ref 1–1.03)
UROBILINOGEN UR QL STRIP.AUTO: 1 EU/DL (ref 0.2–1)
WBC URNS QL MICRO: ABNORMAL /HPF (ref 0–4)

## 2020-11-27 PROCEDURE — 74011250637 HC RX REV CODE- 250/637: Performed by: PSYCHIATRY & NEUROLOGY

## 2020-11-27 PROCEDURE — 93005 ELECTROCARDIOGRAM TRACING: CPT

## 2020-11-27 PROCEDURE — 81001 URINALYSIS AUTO W/SCOPE: CPT

## 2020-11-27 PROCEDURE — 65220000003 HC RM SEMIPRIVATE PSYCH

## 2020-11-27 PROCEDURE — 80061 LIPID PANEL: CPT

## 2020-11-27 PROCEDURE — 87086 URINE CULTURE/COLONY COUNT: CPT

## 2020-11-27 PROCEDURE — 36415 COLL VENOUS BLD VENIPUNCTURE: CPT

## 2020-11-27 PROCEDURE — 83721 ASSAY OF BLOOD LIPOPROTEIN: CPT

## 2020-11-27 PROCEDURE — 74011250637 HC RX REV CODE- 250/637: Performed by: NURSE PRACTITIONER

## 2020-11-27 PROCEDURE — 83036 HEMOGLOBIN GLYCOSYLATED A1C: CPT

## 2020-11-27 RX ORDER — LITHIUM CARBONATE 300 MG/1
300 TABLET, FILM COATED, EXTENDED RELEASE ORAL EVERY 12 HOURS
Status: DISCONTINUED | OUTPATIENT
Start: 2020-11-27 | End: 2020-12-01

## 2020-11-27 RX ADMIN — DIVALPROEX SODIUM 2000 MG: 500 TABLET, EXTENDED RELEASE ORAL at 21:29

## 2020-11-27 RX ADMIN — METOPROLOL TARTRATE 25 MG: 25 TABLET, FILM COATED ORAL at 09:00

## 2020-11-27 RX ADMIN — FLUTICASONE PROPIONATE 2 SPRAY: 50 SPRAY, METERED NASAL at 09:00

## 2020-11-27 RX ADMIN — LITHIUM CARBONATE 300 MG: 300 TABLET, EXTENDED RELEASE ORAL at 21:29

## 2020-11-27 RX ADMIN — OLANZAPINE 5 MG: 5 TABLET, FILM COATED ORAL at 21:30

## 2020-11-27 RX ADMIN — DICYCLOMINE HYDROCHLORIDE 10 MG: 10 CAPSULE ORAL at 21:29

## 2020-11-27 RX ADMIN — DICYCLOMINE HYDROCHLORIDE 10 MG: 10 CAPSULE ORAL at 17:14

## 2020-11-27 RX ADMIN — METOPROLOL TARTRATE 25 MG: 25 TABLET, FILM COATED ORAL at 17:14

## 2020-11-27 RX ADMIN — PANTOPRAZOLE SODIUM 40 MG: 40 TABLET, DELAYED RELEASE ORAL at 09:00

## 2020-11-27 RX ADMIN — DICYCLOMINE HYDROCHLORIDE 10 MG: 10 CAPSULE ORAL at 09:00

## 2020-11-27 RX ADMIN — DICYCLOMINE HYDROCHLORIDE 10 MG: 10 CAPSULE ORAL at 12:40

## 2020-11-27 RX ADMIN — LITHIUM CARBONATE 300 MG: 300 TABLET, EXTENDED RELEASE ORAL at 12:40

## 2020-11-27 NOTE — PROGRESS NOTES
Problem: Falls - Risk of  Goal: *Absence of Falls  Description: Document Albino Messing Fall Risk and appropriate interventions in the flowsheet.   Outcome: Progressing Towards Goal  Note: Fall Risk Interventions:            Medication Interventions: Teach patient to arise slowly                   Problem: Depressed Mood (Adult/Pediatric)  Goal: *STG: Verbalizes anger, guilt, and other feelings in a constructive manor  Outcome: Progressing Towards Goal  Goal: *STG: Attends activities and groups  Outcome: Progressing Towards Goal  Goal: *STG: Demonstrates reduction in symptoms and increase in insight into coping skills/future focused  Outcome: Progressing Towards Goal

## 2020-11-27 NOTE — PROGRESS NOTES
Hospitalist Progress Note          Lyndsay Pittman NP  Please call  and page for questions. Call physician on-call through the  7pm-7am    Daily Progress Note: 11/27/2020    Primary care provider:UNKNOWN    Date of admission: 11/25/2020  2:52 AM    Admission Summary and Hospital Course:      From H&P 11/26/2020:  \"Hima Pink is a 28 y.o. 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 Patient is seen in [facility]. Is concerned about making sure his CPAP is set up in a reasonable time for him to go to sleep as he gets apparently a large dose of Depakote that makes him drowsy. He denies HA, dizziness, visual changes, cough, SOB, CP, abd pain, n/v/d, dysuria or weakness. Review of paper chart shows abnormal UA concerning for UTI, UDS positive for THC, elevated glucose of 128. \"      Subjective:   Pt seen today in [facility]. Labs were not done. Discussed with nursing; patient did not refuse, unclear why labs weren't completed. Requested they be obtained now.      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 User may not have seen previous data.)    Temp (24hrs), Av.7 °F (36.5 °C), Min:97.7 °F (36.5 °C), Max:97.7 °F (36.5 °C)    No intake/output data recorded. No intake/output data recorded. General:  Alert, cooperative, no distress, appears stated age, obese   Lungs:   Clear to auscultation bilaterally. Heart:  Regular rate and rhythm, S1, S2 normal, no murmur, click, rub or gallop. Abdomen:   Soft, non-tender, non-distended. Bowel sounds normal.    Extremities: Extremities normal, atraumatic, no cyanosis or edema. Skin: Skin color, texture, turgor normal. No rashes or lesions   Neurologic: CNII-XII intact, MENSAH, A&Ox4     Data Review:       Recent Days:  No results for input(s): WBC, HGB, HCT, PLT, HGBEXT, HCTEXT, PLTEXT in the last 72 hours. No results for input(s): NA, K, CL, CO2, GLU, BUN, CREA, CA, MG, PHOS, ALB, TBIL, ALT, INR, INREXT in the last 72 hours. No lab exists for component: SGOT  No results for input(s): PH, PCO2, PO2, HCO3, FIO2 in the last 72 hours.     24 Hour Results:  Recent Results (from the past 24 hour(s))   EKG, 12 LEAD, INITIAL    Collection Time: 20  8:43 AM   Result Value Ref Range    Ventricular Rate 60 BPM    Atrial Rate 60 BPM    P-R Interval 150 ms    QRS Duration 98 ms    Q-T Interval 406 ms    QTC Calculation (Bezet) 406 ms    Calculated P Axis 13 degrees    Calculated R Axis 7 degrees    Calculated T Axis 5 degrees    Diagnosis Normal sinus rhythm  No previous ECGs available          Problem List:  Problem List as of 2020 Never Reviewed          Codes Class Noted - Resolved    SANTIAGO on CPAP ICD-10-CM: G47.33, Z99.89  ICD-9-CM: 327.23, V46.8  Unknown - Present        HTN (hypertension) ICD-10-CM: I10  ICD-9-CM: 401.9  Unknown - Present        HLD (hyperlipidemia) ICD-10-CM: E78.5  ICD-9-CM: 272.4  Unknown - Present        Hypothyroid ICD-10-CM: E03.9  ICD-9-CM: 244.9  Unknown - Present        Prediabetes ICD-10-CM: R73.03  ICD-9-CM: 790.29  Unknown - Present        MDD (major depressive disorder) ICD-10-CM: F32.9  ICD-9-CM: 296.20  11/25/2020 - Present              Medications reviewed  Current Facility-Administered Medications   Medication Dose Route Frequency    dicyclomine (BENTYL) capsule 10 mg  10 mg Oral QID    selenium sulfide (SELSUN BLUE) 1 % shampoo   Topical PRN    hydrALAZINE (APRESOLINE) tablet 25 mg  25 mg Oral Q8H PRN    OLANZapine (ZyPREXA) tablet 5 mg  5 mg Oral Q6H PRN    benztropine (COGENTIN) tablet 1 mg  1 mg Oral BID PRN    diphenhydrAMINE (BENADRYL) injection 50 mg  50 mg IntraMUSCular BID PRN    hydrOXYzine HCL (ATARAX) tablet 50 mg  50 mg Oral TID PRN    LORazepam (ATIVAN) injection 1 mg  1 mg IntraMUSCular Q4H PRN    traZODone (DESYREL) tablet 50 mg  50 mg Oral QHS PRN    acetaminophen (TYLENOL) tablet 650 mg  650 mg Oral Q4H PRN    magnesium hydroxide (MILK OF MAGNESIA) 400 mg/5 mL oral suspension 30 mL  30 mL Oral DAILY PRN    OLANZapine (ZyPREXA) 5 mg in sterile water (preservative free) 1 mL injection  5 mg IntraMUSCular Q6H PRN    influenza vaccine 2020-21 (6 mos+)(PF) (FLUARIX/FLULAVAL/FLUZONE QUAD) injection 0.5 mL  0.5 mL IntraMUSCular PRIOR TO DISCHARGE    pantoprazole (PROTONIX) tablet 40 mg  40 mg Oral DAILY    metoprolol tartrate (LOPRESSOR) tablet 25 mg  25 mg Oral BID    fluticasone propionate (FLONASE) 50 mcg/actuation nasal spray 2 Spray  2 Spray Both Nostrils DAILY    albuterol (PROVENTIL HFA, VENTOLIN HFA, PROAIR HFA) inhaler 2 Puff  2 Puff Inhalation Q4H PRN    divalproex ER (DEPAKOTE ER) 24 hour tablet 2,000 mg  2,000 mg Oral QHS    testosterone cypionate (DEPOTESTOTERONE CYPIONATE) injection 100 mg  100 mg Other every Wednesday       Care Plan discussed with: Patient/family, nurse      Katie Rodriguez NP  Hospitalist  Providers can reach me on PerfectServe

## 2020-11-27 NOTE — PROGRESS NOTES
Problem: Patient Education: Go to Patient Education Activity  Goal: Patient/Family Education  Outcome: Progressing Towards Goal     Problem: Depressed Mood (Adult/Pediatric)  Goal: *STG: Participates in treatment plan  Outcome: Progressing Towards Goal  Goal: *STG: Verbalizes anger, guilt, and other feelings in a constructive manor  Outcome: Progressing Towards Goal  Goal: *STG: Attends activities and groups  Outcome: Progressing Towards Goal  Goal: *STG: Demonstrates reduction in symptoms and increase in insight into coping skills/future focused  Outcome: Progressing Towards Goal  Goal: Interventions  Outcome: Progressing Towards Goal

## 2020-11-27 NOTE — INTERDISCIPLINARY ROUNDS
Behavioral Health Interdisciplinary Rounds Patient Name: Estela Natarajan  Age: 28 y.o. Room/Bed:  733/ Primary Diagnosis: <principal problem not specified> Admission Status: Voluntary Readmission within 30 days: no 
Power of  in place: no 
Patient requires a blocked bed: no          Reason for blocked bed: VTE Prophylaxis: No 
 
Mobility needs/Fall risk: no 
Flu Vaccine : no  
Nutritional Plan: no 
Consults:         
Labs/Testing due today?: yes Sleep hours: 7 Participation in Care/Groups:   
Medication Compliant?: Yes PRNS (last 24 hours): Antianxiety and Pain Restraints (last 24 hours):  no 
  
CIWA (range last 24 hours): COWS (range last 24 hours): Alcohol screening (AUDIT) completed -   AUDIT Score: 2 If applicable, date SBIRT discussed in treatment team AND documented:  
AUDIT Screen Score: AUDIT Score: 2 Document Brief Intervention (corresponds directly with the 5 A's, Ask, Advise, Assess, Assist, and Arrange): At- Risk Patients (Score 7-15 for women; 8-15 for men) Discuss concern patient is drinking at unhealthy levels known to increase risk of alcohol-related health problems. Is Patient ready to commit to change? If No: 
? Encourage reflection ? Discuss short term and long term health risks of consuming alcohol ? Barriers to change ? Reaffirm willingness to help / Educational materials provided If Yes: 
? Set goal 
? Plan 
? Educational materials provided Harmful use or Dependence (Score 16 or greater) ? Discuss short term and long term health risks of consuming alcohol ? Recommendations ? Negotiate drinking goal 
? Recommend addiction specialist/center ? Arrange follow-up appointments. Tobacco - patient is a smoker: Have You Used Tobacco in the Past 30 Days: No 
Illegal Drugs use: Have You Used Any Illegal Substances Over the Past 12 Months: Yes 
 
24 hour chart check complete: yes Patient goal(s) for today: Attend groups  
Treatment team focus/goals: medication mgmt Progress note:  Pt reports feeling tired, irritable, endorses passive SI - no plan, with continued obsessive thoughts, stating, \"I just don't understand what's going on in my brain\". He is attending groups and social with peers. He is [facility]; Dr. Imani Flores for med mgmt and Gladys Nesbitt for counseling. Follow-up appts pending discharge. 
  
LOS:  2                        Expected LOS: 
  
Financial concerns/prescription coverage:  No (medicare) Family contact: No contact                              
Family requesting physician contact today:  no 
Discharge plan: Discharge home and follow-up with [facility] Access to weapons : EP                                                           
Outpatient provider(s): [facility] Patient's preferred phone number for follow up call : [phone] [facility]) Patient's preferred e-mail address : 
Participating treatment team members: Randal Romo

## 2020-11-27 NOTE — PROGRESS NOTES
Problem: Falls - Risk of  Goal: *Absence of Falls  Description: Document Bernard Stanford Fall Risk and appropriate interventions in the flowsheet.   11/27/2020 1725 by Brandie Ontiveros RN  Outcome: Progressing Towards Goal  Note: Fall Risk Interventions:            Medication Interventions: Teach patient to arise slowly                11/27/2020 1725 by Brandie Ontiveros RN  Outcome: Progressing Towards Goal  Note: Fall Risk Interventions:            Medication Interventions: Teach patient to arise slowly                11/27/2020 1124 by Brandie Ontiveros RN  Outcome: Progressing Towards Goal  Note: Fall Risk Interventions:            Medication Interventions: Utilize gait belt for transfers/ambulation                   Problem: Patient Education: Go to Patient Education Activity  Goal: Patient/Family Education  Outcome: Progressing Towards Goal

## 2020-11-27 NOTE — PROGRESS NOTES
Problem: Falls - Risk of  Goal: *Absence of Falls  Description: Document Ulises Ana Fall Risk and appropriate interventions in the flowsheet.   Outcome: Progressing Towards Goal  Note: Fall Risk Interventions:    Medication Interventions: Utilize gait belt for transfers/ambulation       Problem: Patient Education: Go to Patient Education Activity  Goal: Patient/Family Education  Outcome: Progressing Towards Goal

## 2020-11-27 NOTE — PROGRESS NOTES
Problem: Falls - Risk of  Goal: *Absence of Falls  Description: Document Rochele Hien Fall Risk and appropriate interventions in the flowsheet. Outcome: Progressing Towards Goal  Note: Fall Risk Interventions:            Medication Interventions: Utilize gait belt for transfers/ambulation          Pt. Received resting in bed, NAD, respirations even and unlabored. Will continue q15 safety rounds.

## 2020-11-27 NOTE — BH NOTES
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 No pertinent surgical history. Allergies   Allergen Reactions    Amoxicillin Unknown (comments)    Geodon [Ziprasidone Hcl] Unknown (comments)    Haloperidol Unknown (comments)    Lamictal [Lamotrigine] Unknown (comments)    Remeron [Mirtazapine] Unknown (comments)    Risperidone Unknown (comments)    Spironolactone Unknown (comments)      Social History     Tobacco Use    Smoking status: Former Smoker    Smokeless tobacco: Never Used   Substance Use Topics    Alcohol use: Not Currently      Family History   Problem Relation Age of Onset    Stroke Mother     Stroke Maternal Aunt         Review of Systems  Pertinent items are noted in HPI. Objective:         Patient Vitals for the past 8 hrs:   BP Pulse Resp SpO2   11/27/20 0745 131/79 66 16 100 %       Lab/Data Review:  Lab results reviewed. For significant abnormal values and values requiring intervention, see assessment and plan.     Mental Status exam: WNL  blunted affect, good EC, denies active SI    Assessment/Plan:   Active Problems:    MDD (major depressive disorder) (11/25/2020)      SANTIAGO on CPAP ()      HTN (hypertension) ()      HLD (hyperlipidemia) ()      Hypothyroid ()      Prediabetes ()            Medications:    Current Facility-Administered Medications   Medication Dose Route Frequency    dicyclomine (BENTYL) capsule 10 mg  10 mg Oral QID    selenium sulfide (SELSUN BLUE) 1 % shampoo   Topical PRN    hydrALAZINE (APRESOLINE) tablet 25 mg  25 mg Oral Q8H PRN    OLANZapine (ZyPREXA) tablet 5 mg  5 mg Oral Q6H PRN    benztropine (COGENTIN) tablet 1 mg  1 mg Oral BID PRN    diphenhydrAMINE (BENADRYL) injection 50 mg  50 mg IntraMUSCular BID PRN    hydrOXYzine HCL (ATARAX) tablet 50 mg  50 mg Oral TID PRN    LORazepam (ATIVAN) injection 1 mg  1 mg IntraMUSCular Q4H PRN    traZODone (DESYREL) tablet 50 mg  50 mg Oral QHS PRN    acetaminophen (TYLENOL) tablet 650 mg  650 mg Oral Q4H PRN    magnesium hydroxide (MILK OF MAGNESIA) 400 mg/5 mL oral suspension 30 mL  30 mL Oral DAILY PRN    OLANZapine (ZyPREXA) 5 mg in sterile water (preservative free) 1 mL injection  5 mg IntraMUSCular Q6H PRN    influenza vaccine 2020-21 (6 mos+)(PF) (FLUARIX/FLULAVAL/FLUZONE QUAD) injection 0.5 mL  0.5 mL IntraMUSCular PRIOR TO DISCHARGE    pantoprazole (PROTONIX) tablet 40 mg  40 mg Oral DAILY    metoprolol tartrate (LOPRESSOR) tablet 25 mg  25 mg Oral BID    fluticasone propionate (FLONASE) 50 mcg/actuation nasal spray 2 Spray  2 Spray Both Nostrils DAILY    albuterol (PROVENTIL HFA, VENTOLIN HFA, PROAIR HFA) inhaler 2 Puff  2 Puff Inhalation Q4H PRN    divalproex ER (DEPAKOTE ER) 24 hour tablet 2,000 mg  2,000 mg Oral QHS    testosterone cypionate (DEPOTESTOTERONE CYPIONATE) injection 100 mg  100 mg Other every Wednesday       Side Effects:  none    The following information was reviewed and discussed:  patient given opportunity to ask questions     Pt reports that his home psychiatrist and he were working on a plan to start Lithium (he has taken in past without incident) for his SI, and wants to restart Lithium in addition to Depakote. Per pt he has tolerated these two mood stabilizers in past without a problem.  We will start Lithium 300 mg po BID, pt comfortable with this plan to manage SI since Lithium is known to reduce SI.

## 2020-11-28 LAB
ATRIAL RATE: 60 BPM
BACTERIA SPEC CULT: NORMAL
CALCULATED P AXIS, ECG09: 13 DEGREES
CALCULATED R AXIS, ECG10: 7 DEGREES
CALCULATED T AXIS, ECG11: 5 DEGREES
CHOLEST SERPL-MCNC: 262 MG/DL
DIAGNOSIS, 93000: NORMAL
HDLC SERPL-MCNC: 31 MG/DL
HDLC SERPL: 8.5 {RATIO} (ref 0–5)
LDLC SERPL CALC-MCNC: ABNORMAL MG/DL (ref 0–100)
LDLC SERPL DIRECT ASSAY-MCNC: 155 MG/DL (ref 0–100)
LIPID PROFILE,FLP: ABNORMAL
P-R INTERVAL, ECG05: 150 MS
Q-T INTERVAL, ECG07: 406 MS
QRS DURATION, ECG06: 98 MS
QTC CALCULATION (BEZET), ECG08: 406 MS
SERVICE CMNT-IMP: NORMAL
TRIGL SERPL-MCNC: 483 MG/DL (ref ?–150)
VENTRICULAR RATE, ECG03: 60 BPM
VLDLC SERPL CALC-MCNC: ABNORMAL MG/DL

## 2020-11-28 PROCEDURE — 74011250637 HC RX REV CODE- 250/637: Performed by: NURSE PRACTITIONER

## 2020-11-28 PROCEDURE — 74011250637 HC RX REV CODE- 250/637: Performed by: PSYCHIATRY & NEUROLOGY

## 2020-11-28 PROCEDURE — 65220000003 HC RM SEMIPRIVATE PSYCH

## 2020-11-28 RX ORDER — OLANZAPINE 5 MG/1
10 TABLET ORAL
Status: DISCONTINUED | OUTPATIENT
Start: 2020-11-28 | End: 2020-12-03 | Stop reason: HOSPADM

## 2020-11-28 RX ORDER — ATORVASTATIN CALCIUM 40 MG/1
40 TABLET, FILM COATED ORAL
Status: DISCONTINUED | OUTPATIENT
Start: 2020-11-28 | End: 2020-12-03 | Stop reason: HOSPADM

## 2020-11-28 RX ORDER — ATORVASTATIN CALCIUM 40 MG/1
40 TABLET, FILM COATED ORAL
Qty: 30 TAB | Refills: 0 | Status: SHIPPED | OUTPATIENT
Start: 2020-11-28

## 2020-11-28 RX ADMIN — DICYCLOMINE HYDROCHLORIDE 10 MG: 10 CAPSULE ORAL at 09:25

## 2020-11-28 RX ADMIN — METOPROLOL TARTRATE 25 MG: 25 TABLET, FILM COATED ORAL at 09:25

## 2020-11-28 RX ADMIN — DIVALPROEX SODIUM 2000 MG: 500 TABLET, EXTENDED RELEASE ORAL at 21:22

## 2020-11-28 RX ADMIN — ATORVASTATIN CALCIUM 40 MG: 40 TABLET, FILM COATED ORAL at 21:23

## 2020-11-28 RX ADMIN — TRAZODONE HYDROCHLORIDE 50 MG: 50 TABLET ORAL at 21:23

## 2020-11-28 RX ADMIN — HYDROXYZINE HYDROCHLORIDE 50 MG: 50 TABLET, FILM COATED ORAL at 16:16

## 2020-11-28 RX ADMIN — DICYCLOMINE HYDROCHLORIDE 10 MG: 10 CAPSULE ORAL at 12:23

## 2020-11-28 RX ADMIN — PANTOPRAZOLE SODIUM 40 MG: 40 TABLET, DELAYED RELEASE ORAL at 09:26

## 2020-11-28 RX ADMIN — DICYCLOMINE HYDROCHLORIDE 10 MG: 10 CAPSULE ORAL at 17:16

## 2020-11-28 RX ADMIN — METOPROLOL TARTRATE 25 MG: 25 TABLET, FILM COATED ORAL at 17:16

## 2020-11-28 RX ADMIN — FLUTICASONE PROPIONATE 2 SPRAY: 50 SPRAY, METERED NASAL at 09:25

## 2020-11-28 RX ADMIN — LITHIUM CARBONATE 300 MG: 300 TABLET, EXTENDED RELEASE ORAL at 21:23

## 2020-11-28 RX ADMIN — DICYCLOMINE HYDROCHLORIDE 10 MG: 10 CAPSULE ORAL at 21:23

## 2020-11-28 RX ADMIN — LITHIUM CARBONATE 300 MG: 300 TABLET, EXTENDED RELEASE ORAL at 09:25

## 2020-11-28 NOTE — BH NOTES
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 He states, \"I would like to have a fidget toy or something to help calm my nerves at times. \" Patient is calm and cooperative during assessment and denies SI/HI/AVH at this time. Eating and sleeping fairly. No aggression or behaviors noted at this time. Patient Active Problem List    Diagnosis Date Noted    SANTIAGO on CPAP     HTN (hypertension)     HLD (hyperlipidemia)     Hypothyroid     Prediabetes     MDD (major depressive disorder) 11/25/2020     History reviewed. No pertinent surgical history. Allergies   Allergen Reactions    Amoxicillin Unknown (comments)    Geodon [Ziprasidone Hcl] Unknown (comments)    Haloperidol Unknown (comments)    Lamictal [Lamotrigine] Unknown (comments)    Remeron [Mirtazapine] Unknown (comments)    Risperidone Unknown (comments)    Spironolactone Unknown (comments)      Social History     Tobacco Use    Smoking status: Former Smoker    Smokeless tobacco: Never Used   Substance Use Topics    Alcohol use: Not Currently      Family History   Problem Relation Age of Onset    Stroke Mother     Stroke Maternal Aunt         Review of Systems  Pertinent items are noted in HPI. Objective:         Patient Vitals for the past 8 hrs:   BP Temp Pulse Resp SpO2   11/28/20 0938 136/82  68     11/28/20 0822 135/89 97.2 °F (36.2 °C) 75 16 100 %       Lab/Data Review:  Lab results reviewed. For significant abnormal values and values requiring intervention, see assessment and plan.     Mental Status exam: WNL  blunted affect, good EC, denies active SI    Assessment/Plan:   Active Problems:    MDD (major depressive disorder) (11/25/2020)      SANTIAGO on CPAP ()      HTN (hypertension) ()      HLD (hyperlipidemia) ()      Hypothyroid ()      Prediabetes ()            Medications:    Current Facility-Administered Medications   Medication Dose Route Frequency    OLANZapine (ZyPREXA) tablet 10 mg  10 mg Oral Q12H PRN    lithium carbonate SR (LITHOBID) tablet 300 mg  300 mg Oral Q12H    dicyclomine (BENTYL) capsule 10 mg  10 mg Oral QID    selenium sulfide (SELSUN BLUE) 1 % shampoo   Topical PRN    hydrALAZINE (APRESOLINE) tablet 25 mg  25 mg Oral Q8H PRN    benztropine (COGENTIN) tablet 1 mg  1 mg Oral BID PRN    diphenhydrAMINE (BENADRYL) injection 50 mg  50 mg IntraMUSCular BID PRN    hydrOXYzine HCL (ATARAX) tablet 50 mg  50 mg Oral TID PRN    LORazepam (ATIVAN) injection 1 mg  1 mg IntraMUSCular Q4H PRN    traZODone (DESYREL) tablet 50 mg  50 mg Oral QHS PRN    acetaminophen (TYLENOL) tablet 650 mg  650 mg Oral Q4H PRN    magnesium hydroxide (MILK OF MAGNESIA) 400 mg/5 mL oral suspension 30 mL  30 mL Oral DAILY PRN    OLANZapine (ZyPREXA) 5 mg in sterile water (preservative free) 1 mL injection  5 mg IntraMUSCular Q6H PRN    influenza vaccine 2020-21 (6 mos+)(PF) (FLUARIX/FLULAVAL/FLUZONE QUAD) injection 0.5 mL  0.5 mL IntraMUSCular PRIOR TO DISCHARGE    pantoprazole (PROTONIX) tablet 40 mg  40 mg Oral DAILY    metoprolol tartrate (LOPRESSOR) tablet 25 mg  25 mg Oral BID    fluticasone propionate (FLONASE) 50 mcg/actuation nasal spray 2 Spray  2 Spray Both Nostrils DAILY    albuterol (PROVENTIL HFA, VENTOLIN HFA, PROAIR HFA) inhaler 2 Puff  2 Puff Inhalation Q4H PRN    divalproex ER (DEPAKOTE ER) 24 hour tablet 2,000 mg  2,000 mg Oral QHS    testosterone cypionate (DEPOTESTOTERONE CYPIONATE) injection 100 mg  100 mg Other every Wednesday       Side Effects:  none    The following information was reviewed and discussed:  patient given opportunity to ask questions

## 2020-11-28 NOTE — PROGRESS NOTES
Pt appears to be sleeping. No distress noted. Respirations WNL. Will continue to monitor q15 for safety. Problem: Falls - Risk of  Goal: *Absence of Falls  Description: Document Hernesto Corcoran Fall Risk and appropriate interventions in the flowsheet.   Outcome: Progressing Towards Goal  Note: Fall Risk Interventions:            Medication Interventions: Teach patient to arise slowly

## 2020-11-28 NOTE — PROGRESS NOTES
Problem: Falls - Risk of  Goal: *Absence of Falls  Description: Document Napoleon Jensen Fall Risk and appropriate interventions in the flowsheet. Outcome: Progressing Towards Goal  Note: Fall Risk Interventions:     Medication Interventions: Teach patient to arise slowly     Problem: Patient Education: Go to Patient Education Activity  Goal: Patient/Family Education  Outcome: Progressing Towards Goal     Problem: Depressed Mood (Adult/Pediatric)  Goal: *STG: Participates in treatment plan  Outcome: Progressing Towards Goal  Note: Pt participated in treatment team. Out on the unit for small periods of time. Reviewed medications adjustment during treatment team. Encouraged to attend groups and participate.   Goal: Interventions  Outcome: Progressing Towards Goal     Problem: Patient Education: Go to Patient Education Activity  Goal: Patient/Family Education  Outcome: Progressing Towards Goal

## 2020-11-28 NOTE — BH NOTES
GROUP THERAPY PROGRESS NOTE    Patient is participating in Discharge/Goals Group. Group time: 50 minutes    Personal goal for participation: Process feelings related to discharge and/or feelings/goals for today. Goal orientation: Personal    Group therapy participation: active    Therapeutic interventions reviewed and discussed: Group discussion was focused on discharge plans and anxiety related to this. Group members discussed what they planned to do once discharge and discharge plans. Discussion also related to support and communication issues that arise. Group members verbalized how they are feeling today, their personal goal for today, and goals for the week. Patients were given an opportunity to share any concerns and issues they were having    Impression of participation: Charmayne Pagoda actively participated in group. He completed his safety plan and had no concerns or questions. Pt has good insight about the discharge process.      Nora Hernandez, Supervisee in Social Work

## 2020-11-28 NOTE — PROGRESS NOTES
Hospitalist Progress Note          Wendy Duque NP  Please call  and page for questions. Call physician on-call through the  7pm-7am    Daily Progress Note: 11/28/2020    Primary care provider:UNKNOWN    Date of admission: 11/25/2020  2:52 AM    Admission Summary and Hospital Course:      From H&P 11/26/2020:  \"Hima Dong is a 28 y.o. 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 Patient is seen in [facility]. Is concerned about making sure his CPAP is set up in a reasonable time for him to go to sleep as he gets apparently a large dose of Depakote that makes him drowsy. He denies HA, dizziness, visual changes, cough, SOB, CP, abd pain, n/v/d, dysuria or weakness. Review of paper chart shows abnormal UA concerning for UTI, UDS positive for THC, elevated glucose of 128. \"    Subjective:   Pt seen today in [facility]. He reports that he had a total hysterectomy in 2017 as part of female to male transition. Discussed EKG results, denies any knowledge of family history of sudden cardiac death but does report a history of MVP and sometimes feels his heart is racing. Currently he has no complaints.      Assessment/Plan:     MDD   -Mgt per primary team     Abnormal UA  -UTI ruled out  -Repeat UA is neg for UTI     Prediabetes  -Stable, no further intervention needed  -A1c 5.5     SANTIAGO on CPAP  -cont home CPAP  -EKG NSR w/ J point elevation     Hx MVP: J point elevation on EKG  -check echo  -If echo abnormal would recommend cardiology consult  -f/u with PCP or cardiology on discharge    HTN  -Stable  -continue home lopressor  -cont PRN hydralazine     HLD  -Lipid panel: , TChol 262, Trig 483     Hypothyroidism  -Pt denies hx  -TSH, free T4 NL  -No further intervention/workup needed     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 Please feel free to call with questions. Review of Systems:     Full ROS complete with pertinent positives and negatives as per HPI, otherwise negative  Objective:   Physical Exam:     Visit Vitals  BP (!) 127/92 (BP Patient Position: Sitting)   Pulse 68   Temp 97.8 °F (36.6 °C)   Resp 18   Ht 5' 4\" (1.626 m)   SpO2 96%      O2 Device: CPAP mask(Simultaneous filing. User may not have seen previous data.)    Temp (24hrs), Av.8 °F (36.6 °C), Min:97.2 °F (36.2 °C), Max:98.5 °F (36.9 °C)    No intake/output data recorded. No intake/output data recorded. General:  Alert, cooperative, no distress, appears stated age, obese   Lungs:   Clear to auscultation bilaterally. Heart:  Regular rate and rhythm, S1, S2 normal, no murmur, click, rub or gallop. Abdomen:   Soft, non-tender, non-distended. Bowel sounds normal.    Extremities: Extremities normal, atraumatic, no cyanosis or edema. Skin: Skin color, texture, turgor normal. No rashes or lesions   Neurologic: CNII-XII intact, MENSAH, A&Ox4     Data Review:       Recent Days:  No results for input(s): WBC, HGB, HCT, PLT, HGBEXT, HCTEXT, PLTEXT, HGBEXT, HCTEXT, PLTEXT in the last 72 hours. No results for input(s): NA, K, CL, CO2, GLU, BUN, CREA, CA, MG, PHOS, ALB, TBIL, ALT, INR, INREXT, INREXT in the last 72 hours. No lab exists for component: SGOT  No results for input(s): PH, PCO2, PO2, HCO3, FIO2 in the last 72 hours. 24 Hour Results:  No results found for this or any previous visit (from the past 24 hour(s)).     Problem List:  Problem List as of 2020 Never Reviewed          Codes Class Noted - Resolved    SNATIAGO on CPAP ICD-10-CM: G47.33, Z99.89  ICD-9-CM: 327.23, V46.8  Unknown - Present        HTN (hypertension) ICD-10-CM: I10  ICD-9-CM: 401.9  Unknown - Present        HLD (hyperlipidemia) ICD-10-CM: E78.5  ICD-9-CM: 272.4  Unknown - Present        Hypothyroid ICD-10-CM: E03.9  ICD-9-CM: 244.9  Unknown - Present        Prediabetes ICD-10-CM: R73.03  ICD-9-CM: 790.29  Unknown - Present        MDD (major depressive disorder) ICD-10-CM: F32.9  ICD-9-CM: 296.20  11/25/2020 - Present              Medications reviewed  Current Facility-Administered Medications   Medication Dose Route Frequency    OLANZapine (ZyPREXA) tablet 10 mg  10 mg Oral Q12H PRN    lithium carbonate SR (LITHOBID) tablet 300 mg  300 mg Oral Q12H    dicyclomine (BENTYL) capsule 10 mg  10 mg Oral QID    selenium sulfide (SELSUN BLUE) 1 % shampoo   Topical PRN    hydrALAZINE (APRESOLINE) tablet 25 mg  25 mg Oral Q8H PRN    benztropine (COGENTIN) tablet 1 mg  1 mg Oral BID PRN    diphenhydrAMINE (BENADRYL) injection 50 mg  50 mg IntraMUSCular BID PRN    hydrOXYzine HCL (ATARAX) tablet 50 mg  50 mg Oral TID PRN    LORazepam (ATIVAN) injection 1 mg  1 mg IntraMUSCular Q4H PRN    traZODone (DESYREL) tablet 50 mg  50 mg Oral QHS PRN    acetaminophen (TYLENOL) tablet 650 mg  650 mg Oral Q4H PRN    magnesium hydroxide (MILK OF MAGNESIA) 400 mg/5 mL oral suspension 30 mL  30 mL Oral DAILY PRN    OLANZapine (ZyPREXA) 5 mg in sterile water (preservative free) 1 mL injection  5 mg IntraMUSCular Q6H PRN    influenza vaccine 2020-21 (6 mos+)(PF) (FLUARIX/FLULAVAL/FLUZONE QUAD) injection 0.5 mL  0.5 mL IntraMUSCular PRIOR TO DISCHARGE    pantoprazole (PROTONIX) tablet 40 mg  40 mg Oral DAILY    metoprolol tartrate (LOPRESSOR) tablet 25 mg  25 mg Oral BID    fluticasone propionate (FLONASE) 50 mcg/actuation nasal spray 2 Spray  2 Spray Both Nostrils DAILY    albuterol (PROVENTIL HFA, VENTOLIN HFA, PROAIR HFA) inhaler 2 Puff  2 Puff Inhalation Q4H PRN    divalproex ER (DEPAKOTE ER) 24 hour tablet 2,000 mg  2,000 mg Oral QHS    testosterone cypionate (DEPOTESTOTERONE CYPIONATE) injection 100 mg  100 mg Other every Wednesday       Care Plan discussed with: Patient/family, nurse      Abiel Kenyon NP  Hospitalist  Providers can reach me on [employer]

## 2020-11-28 NOTE — PROGRESS NOTES
PRN Medication Documentation    Specific patient behavior that led to need for PRN medication: Increased anxiety    Staff interventions attempted prior to PRN being given: Therapeutic communication     PRN medication given: Atarax 50mg PO     Patient response/effectiveness of PRN medication: Will continue to monitor. Problem: Falls - Risk of  Goal: *Absence of Falls  Description: Document Baeza Pipes Fall Risk and appropriate interventions in the flowsheet. Outcome: Progressing Towards Goal  Note: Fall Risk Interventions:            Medication Interventions: Teach patient to arise slowly     1815: Patient received awake and alert and sitting in the dining room interacting with staff and peers. Mood and affect; dull, flat, anxious and can be demanding. Denies SI/HI. Will continue to monitor q15 minutes for safety checks.

## 2020-11-28 NOTE — BH NOTES
GROUP THERAPY PROGRESS NOTE    Patient is participating in coping skills group. Group time: 50 minutes    Personal goal for participation: Learn coping skills to reduce stress and anxiety    Goal orientation: Personal    Group therapy participation: active    Therapeutic interventions reviewed and discussed: Group discussion on ways patients are coping with anxiety and stress and ways they relax. Discussion regarding alternative coping skills using the letters of the alphabet so that each patient would have a list of at least 26 different coping skills. Impression of participation: Jenaro Yeager actively participated in group. He shared coping skills with other patients and was encouraging. Pt was redirected a few times and encouraged to focus on healthy coping skills that did not involve the use of substances.      Nora Hernandez, Supervisee in Social Work

## 2020-11-29 ENCOUNTER — APPOINTMENT (OUTPATIENT)
Dept: NON INVASIVE DIAGNOSTICS | Age: 35
DRG: 885 | End: 2020-11-29
Attending: NURSE PRACTITIONER
Payer: MEDICARE

## 2020-11-29 LAB
ECHO AO ROOT DIAM: 2.87 CM
ECHO AV PEAK GRADIENT: 6.26 MMHG
ECHO AV PEAK VELOCITY: 125.11 CM/S
ECHO LA MAJOR AXIS: 4.02 CM
ECHO LA MINOR AXIS: 1.89 CM
ECHO LV INTERNAL DIMENSION DIASTOLIC: 4.2 CM (ref 4.2–5.9)
ECHO LV INTERNAL DIMENSION SYSTOLIC: 2.8 CM
ECHO LV IVSD: 1.08 CM (ref 0.6–1)
ECHO LV MASS 2D: 190.3 G (ref 88–224)
ECHO LV MASS INDEX 2D: 89.6 G/M2 (ref 49–115)
ECHO LV POSTERIOR WALL DIASTOLIC: 1.43 CM (ref 0.6–1)
ECHO LVOT PEAK GRADIENT: 3.73 MMHG
ECHO LVOT PEAK VELOCITY: 96.62 CM/S
ECHO MV A VELOCITY: 66.84 CM/S
ECHO MV AREA PHT: 5.06 CM2
ECHO MV E DECELERATION TIME (DT): 149.87 MS
ECHO MV E VELOCITY: 94.11 CM/S
ECHO MV E/A RATIO: 1.41
ECHO MV PRESSURE HALF TIME (PHT): 43.46 MS
ECHO PV PEAK INSTANTANEOUS GRADIENT SYSTOLIC: 3.5 MMHG
ECHO RV INTERNAL DIMENSION: 3.34 CM
ECHO RV TAPSE: 1.99 CM (ref 1.5–2)
VALPROATE SERPL-MCNC: 125 UG/ML (ref 50–100)

## 2020-11-29 PROCEDURE — 74011250637 HC RX REV CODE- 250/637: Performed by: NURSE PRACTITIONER

## 2020-11-29 PROCEDURE — 36415 COLL VENOUS BLD VENIPUNCTURE: CPT

## 2020-11-29 PROCEDURE — 74011250637 HC RX REV CODE- 250/637: Performed by: PSYCHIATRY & NEUROLOGY

## 2020-11-29 PROCEDURE — 93306 TTE W/DOPPLER COMPLETE: CPT | Performed by: SPECIALIST

## 2020-11-29 PROCEDURE — 80164 ASSAY DIPROPYLACETIC ACD TOT: CPT

## 2020-11-29 PROCEDURE — 93306 TTE W/DOPPLER COMPLETE: CPT

## 2020-11-29 PROCEDURE — 65220000003 HC RM SEMIPRIVATE PSYCH

## 2020-11-29 RX ADMIN — HYDROXYZINE HYDROCHLORIDE 50 MG: 50 TABLET, FILM COATED ORAL at 17:17

## 2020-11-29 RX ADMIN — DICYCLOMINE HYDROCHLORIDE 10 MG: 10 CAPSULE ORAL at 08:42

## 2020-11-29 RX ADMIN — TRAZODONE HYDROCHLORIDE 50 MG: 50 TABLET ORAL at 21:21

## 2020-11-29 RX ADMIN — DICYCLOMINE HYDROCHLORIDE 10 MG: 10 CAPSULE ORAL at 12:44

## 2020-11-29 RX ADMIN — METOPROLOL TARTRATE 25 MG: 25 TABLET, FILM COATED ORAL at 17:17

## 2020-11-29 RX ADMIN — LITHIUM CARBONATE 300 MG: 300 TABLET, EXTENDED RELEASE ORAL at 21:21

## 2020-11-29 RX ADMIN — DIVALPROEX SODIUM 2000 MG: 500 TABLET, EXTENDED RELEASE ORAL at 21:21

## 2020-11-29 RX ADMIN — METOPROLOL TARTRATE 25 MG: 25 TABLET, FILM COATED ORAL at 08:42

## 2020-11-29 RX ADMIN — DICYCLOMINE HYDROCHLORIDE 10 MG: 10 CAPSULE ORAL at 21:21

## 2020-11-29 RX ADMIN — LITHIUM CARBONATE 300 MG: 300 TABLET, EXTENDED RELEASE ORAL at 08:42

## 2020-11-29 RX ADMIN — PANTOPRAZOLE SODIUM 40 MG: 40 TABLET, DELAYED RELEASE ORAL at 08:43

## 2020-11-29 RX ADMIN — OLANZAPINE 10 MG: 5 TABLET, FILM COATED ORAL at 23:17

## 2020-11-29 RX ADMIN — FLUTICASONE PROPIONATE 2 SPRAY: 50 SPRAY, METERED NASAL at 08:42

## 2020-11-29 RX ADMIN — DICYCLOMINE HYDROCHLORIDE 10 MG: 10 CAPSULE ORAL at 17:17

## 2020-11-29 RX ADMIN — ATORVASTATIN CALCIUM 40 MG: 40 TABLET, FILM COATED ORAL at 21:21

## 2020-11-29 NOTE — BH NOTES
GROUP THERAPY PROGRESS NOTE    Patient did not participate in Anger group.      Nora Hernandez, Supervisee in Social Work

## 2020-11-29 NOTE — BH NOTES
GROUP THERAPY PROGRESS NOTE    Patient did not participate in Guided Imagery group.      Nora Hernandez, Supervisee in Social Work

## 2020-11-29 NOTE — BH NOTES
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 He endorses he had a couple of \"dizzy\" spells yesterday but today he feels a lot better. Patient states, \"I have a history of mitral valve prolapse but haven't been to see a cardiologist in over 7 years. Endorses he is eating and sleeping well at this time. No aggression noted at this time. History reviewed. No pertinent surgical history. Allergies   Allergen Reactions    Amoxicillin Unknown (comments)    Geodon [Ziprasidone Hcl] Unknown (comments)    Haloperidol Unknown (comments)    Lamictal [Lamotrigine] Unknown (comments)    Remeron [Mirtazapine] Unknown (comments)    Risperidone Unknown (comments)    Spironolactone Unknown (comments)      Social History     Tobacco Use    Smoking status: Former Smoker    Smokeless tobacco: Never Used   Substance Use Topics    Alcohol use: Not Currently      Family History   Problem Relation Age of Onset    Stroke Mother     Stroke Maternal Aunt         Review of Systems  Pertinent items are noted in HPI. Objective:         Patient Vitals for the past 8 hrs:   BP Temp Pulse Resp SpO2 Weight   11/29/20 0836 139/81 97.3 °F (36.3 °C) 71 16 98 % 110.7 kg (244 lb)       Lab/Data Review:  Lab results reviewed. For significant abnormal values and values requiring intervention, see assessment and plan.     Mental Status exam: WNL  blunted affect, good EC, denies active SI    Assessment/Plan:   Active Problems:    MDD (major depressive disorder) (11/25/2020)      SANTIAGO on CPAP ()      HTN (hypertension) ()      HLD (hyperlipidemia) ()      Hypothyroid ()      Prediabetes ()            Medications:    Current Facility-Administered Medications   Medication Dose Route Frequency    OLANZapine (ZyPREXA) tablet 10 mg 10 mg Oral Q12H PRN    atorvastatin (LIPITOR) tablet 40 mg  40 mg Oral QHS    lithium carbonate SR (LITHOBID) tablet 300 mg  300 mg Oral Q12H    dicyclomine (BENTYL) capsule 10 mg  10 mg Oral QID    selenium sulfide (SELSUN BLUE) 1 % shampoo   Topical PRN    hydrALAZINE (APRESOLINE) tablet 25 mg  25 mg Oral Q8H PRN    benztropine (COGENTIN) tablet 1 mg  1 mg Oral BID PRN    diphenhydrAMINE (BENADRYL) injection 50 mg  50 mg IntraMUSCular BID PRN    hydrOXYzine HCL (ATARAX) tablet 50 mg  50 mg Oral TID PRN    LORazepam (ATIVAN) injection 1 mg  1 mg IntraMUSCular Q4H PRN    traZODone (DESYREL) tablet 50 mg  50 mg Oral QHS PRN    acetaminophen (TYLENOL) tablet 650 mg  650 mg Oral Q4H PRN    magnesium hydroxide (MILK OF MAGNESIA) 400 mg/5 mL oral suspension 30 mL  30 mL Oral DAILY PRN    OLANZapine (ZyPREXA) 5 mg in sterile water (preservative free) 1 mL injection  5 mg IntraMUSCular Q6H PRN    influenza vaccine 2020-21 (6 mos+)(PF) (FLUARIX/FLULAVAL/FLUZONE QUAD) injection 0.5 mL  0.5 mL IntraMUSCular PRIOR TO DISCHARGE    pantoprazole (PROTONIX) tablet 40 mg  40 mg Oral DAILY    metoprolol tartrate (LOPRESSOR) tablet 25 mg  25 mg Oral BID    fluticasone propionate (FLONASE) 50 mcg/actuation nasal spray 2 Spray  2 Spray Both Nostrils DAILY    albuterol (PROVENTIL HFA, VENTOLIN HFA, PROAIR HFA) inhaler 2 Puff  2 Puff Inhalation Q4H PRN    divalproex ER (DEPAKOTE ER) 24 hour tablet 2,000 mg  2,000 mg Oral QHS    testosterone cypionate (DEPOTESTOTERONE CYPIONATE) injection 100 mg  100 mg Other every Wednesday       Side Effects:  none    The following information was reviewed and discussed:  patient given opportunity to ask questions

## 2020-11-29 NOTE — PROGRESS NOTES
Problem: Falls - Risk of  Goal: *Absence of Falls  Description: Document Ulises Perez Fall Risk and appropriate interventions in the flowsheet.   Outcome: Progressing Towards Goal  Note: Fall Risk Interventions:      Medication Interventions: Evaluate medications/consider consulting pharmacy, Teach patient to arise slowly     Problem: Patient Education: Go to Patient Education Activity  Goal: Patient/Family Education  Outcome: Progressing Towards Goal     1718: PRN Medication Documentation    Specific patient behavior that led to need for PRN medication: pt expressing feeling anxious  Staff interventions attempted prior to PRN being given: relaxation techniques  PRN medication given: atarax    PRN Medication Documentation    Specific patient behavior that led to need for PRN medication: trouble sleeping  Staff interventions attempted prior to PRN being given: relaxation techniques  PRN medication given: trazodone

## 2020-11-29 NOTE — PROGRESS NOTES
Problem: Falls - Risk of  Goal: *Absence of Falls  Description: Document Aryan Magdaleno Fall Risk and appropriate interventions in the flowsheet. Outcome: Progressing Towards Goal  Note: Fall Risk Interventions:    Medication Interventions: Teach patient to arise slowly    Problem: Patient Education: Go to Patient Education Activity  Goal: Patient/Family Education  Outcome: Progressing Towards Goal     Problem: Depressed Mood (Adult/Pediatric)  Goal: *STG: Participates in treatment plan  Outcome: Progressing Towards Goal  Note: Pt participated in treatment team. Less anxious during the shift. ECHO completed in the morning. Encouraged pt to attend groups and participate.   Goal: Interventions  Outcome: Progressing Towards Goal     Problem: Patient Education: Go to Patient Education Activity  Goal: Patient/Family Education  Outcome: Progressing Towards Goal

## 2020-11-29 NOTE — BH NOTES
GROUP THERAPY PROGRESS NOTE    Patient did not participate in Process Group     Nora Hernandez, Supervisee in Social Work

## 2020-11-29 NOTE — BH NOTES
GROUP THERAPY PROGRESS NOTE    Patient is participating in [address] [place] [address]. Group time: 30 minutes    Personal goal for participation: To come up with a plan of things to help maintain wellness of the whole person    Goal orientation: Personal    Group therapy participation: active    Therapeutic interventions reviewed and discussed: Group discussion of the wellness wheel and how they plan to balance themselves by looking at spiritual, physical, occupations, intellectual, social/family, and emotional aspects of their lives. Impression of participation: Mitch Oconnell was pleasant and cheerful in group. Patient actively participated in the activity and engaged in discussion.      Nora Hernandez, Supervisee in Social Work

## 2020-11-29 NOTE — BH NOTES
Behavioral Health Interdisciplinary Rounds     Patient Name: Judy Staff  Age: 28 y.o. Room/Bed:  733/  Primary Diagnosis: <principal problem not specified>   Admission Status: Voluntary     Readmission within 30 days: no  Power of  in place: no  Patient requires a blocked bed: no          Reason for blocked bed: n/a    VTE Prophylaxis: No    Mobility needs/Fall risk: no  Flu Vaccine : no   Nutritional Plan: no  Consults:          Labs/Testing due today?: yes    Sleep hours: 7 1/4       Participation in Care/Groups:  yes  Medication Compliant?: Yes  PRNS (last 24 hours): Antianxiety and Sleep Aid    Restraints (last 24 hours):  no     CIWA (range last 24 hours):     COWS (range last 24 hours):      Alcohol screening (AUDIT) completed -   AUDIT Score: 2     If applicable, date SBIRT discussed in treatment team AND documented:   AUDIT Screen Score: AUDIT Score: 2      Document Brief Intervention (corresponds directly with the 5 A's, Ask, Advise, Assess, Assist, and Arrange): At- Risk Patients (Score 7-15 for women; 8-15 for men)  Discuss concern patient is drinking at unhealthy levels known to increase risk of alcohol-related health problems. Is Patient ready to commit to change? If No:   Encourage reflection   Discuss short term and long term health risks of consuming alcohol   Barriers to change   Reaffirm willingness to help / Educational materials provided  If Yes:   Set goal  TIME PLUS Q provided    Harmful use or Dependence (Score 16 or greater)   Discuss short term and long term health risks of consuming alcohol   Recommendations   Negotiate drinking goal   Recommend addiction specialist/center   Arrange follow-up appointments.     Tobacco - patient is a smoker: Have You Used Tobacco in the Past 30 Days: No  Illegal Drugs use: Have You Used Any Illegal Substances Over the Past 12 Months: Yes    24 hour chart check complete: yes     Patient goal(s) for today:   Treatment team focus/goals:   Progress note     LOS:  4  Expected LOS:     Financial concerns/prescription coverage:    Family contact:       Family requesting physician contact today:    Discharge plan:   Access to weapons : no        Outpatient provider(s):   Patient's preferred phone number for follow up call :   Patient's preferred e-mail address :  Participating treatment team members: Abimael Zuleta, * (assigned SW),

## 2020-11-29 NOTE — PROGRESS NOTES
Problem: Falls - Risk of  Goal: *Absence of Falls  Description: Document Larisa El Fall Risk and appropriate interventions in the flowsheet. Outcome: Progressing Towards Goal  Note: Fall Risk Interventions:        Medication Interventions: Teach patient to arise slowly     Patient asleep in bed at this time. Respirations regular and even. Continue to monitor q 15 minutes for safety.

## 2020-11-30 PROCEDURE — 74011250637 HC RX REV CODE- 250/637: Performed by: NURSE PRACTITIONER

## 2020-11-30 PROCEDURE — 74011250637 HC RX REV CODE- 250/637: Performed by: PSYCHIATRY & NEUROLOGY

## 2020-11-30 PROCEDURE — 65220000003 HC RM SEMIPRIVATE PSYCH

## 2020-11-30 RX ORDER — DICYCLOMINE HYDROCHLORIDE 10 MG/1
10 CAPSULE ORAL
Status: DISCONTINUED | OUTPATIENT
Start: 2020-11-30 | End: 2020-12-03 | Stop reason: HOSPADM

## 2020-11-30 RX ORDER — TRIAMCINOLONE ACETONIDE 1 MG/G
CREAM TOPICAL DAILY PRN
Status: DISCONTINUED | OUTPATIENT
Start: 2020-11-30 | End: 2020-12-03 | Stop reason: HOSPADM

## 2020-11-30 RX ADMIN — LITHIUM CARBONATE 300 MG: 300 TABLET, EXTENDED RELEASE ORAL at 21:19

## 2020-11-30 RX ADMIN — FLUTICASONE PROPIONATE 2 SPRAY: 50 SPRAY, METERED NASAL at 08:36

## 2020-11-30 RX ADMIN — DICYCLOMINE HYDROCHLORIDE 10 MG: 10 CAPSULE ORAL at 08:36

## 2020-11-30 RX ADMIN — METOPROLOL TARTRATE 25 MG: 25 TABLET, FILM COATED ORAL at 08:36

## 2020-11-30 RX ADMIN — TRAZODONE HYDROCHLORIDE 50 MG: 50 TABLET ORAL at 21:21

## 2020-11-30 RX ADMIN — PANTOPRAZOLE SODIUM 40 MG: 40 TABLET, DELAYED RELEASE ORAL at 08:36

## 2020-11-30 RX ADMIN — METOPROLOL TARTRATE 25 MG: 25 TABLET, FILM COATED ORAL at 17:26

## 2020-11-30 RX ADMIN — DIVALPROEX SODIUM 1750 MG: 250 TABLET, EXTENDED RELEASE ORAL at 21:20

## 2020-11-30 RX ADMIN — TRIAMCINOLONE ACETONIDE: 1 CREAM TOPICAL at 21:51

## 2020-11-30 RX ADMIN — ATORVASTATIN CALCIUM 40 MG: 40 TABLET, FILM COATED ORAL at 21:19

## 2020-11-30 RX ADMIN — LITHIUM CARBONATE 300 MG: 300 TABLET, EXTENDED RELEASE ORAL at 08:36

## 2020-11-30 NOTE — BH NOTES
2317:    PRN Medication Documentation    Specific patient behavior that led to need for PRN medication: pt request for agitation, racing thoughts  Staff interventions attempted prior to PRN being given: coping skills  PRN medication given: Zyprexa 10 mg PO  Patient response/effectiveness of PRN medication: 0055: pt appears asleep

## 2020-11-30 NOTE — PROGRESS NOTES
Laboratory Monitoring for Divalproex/Valproic Acid: This patient is currently prescribed the following medication(s):   Current Facility-Administered Medications   Medication Dose Route Frequency    divalproex ER (DEPAKOTE ER) 24 hour tablet 1,750 mg  1,750 mg Oral QHS    atorvastatin (LIPITOR) tablet 40 mg  40 mg Oral QHS    lithium carbonate SR (LITHOBID) tablet 300 mg  300 mg Oral Q12H    influenza vaccine 2020-21 (6 mos+)(PF) (FLUARIX/FLULAVAL/FLUZONE QUAD) injection 0.5 mL  0.5 mL IntraMUSCular PRIOR TO DISCHARGE    pantoprazole (PROTONIX) tablet 40 mg  40 mg Oral DAILY    metoprolol tartrate (LOPRESSOR) tablet 25 mg  25 mg Oral BID    fluticasone propionate (FLONASE) 50 mcg/actuation nasal spray 2 Spray  2 Spray Both Nostrils DAILY    testosterone cypionate (DEPOTESTOTERONE CYPIONATE) injection 100 mg  100 mg Other every Wednesday     The following labs have been completed for monitoring of valproic acid:    Valproic Acid Serum Concentration  Lab Results   Component Value Date/Time    Valproic acid 125 (H) 11/29/2020 05:49 PM       Hepatic Function  Lab Results   Component Value Date/Time    Bilirubin, total 0.3 05/13/2009 07:30 PM    Protein, total 7.0 05/13/2009 07:30 PM    Albumin 4.0 05/13/2009 07:30 PM    Globulin 3.0 05/13/2009 07:30 PM    A-G Ratio 1.3 05/13/2009 07:30 PM    ALT (SGPT) 42 05/13/2009 07:30 PM    AST (SGOT) 7 (L) 05/13/2009 07:30 PM    Alk. phosphatase 63 05/13/2009 07:30 PM     Hematology  Lab Results   Component Value Date/Time    WBC 5.7 05/13/2009 07:30 PM    RBC 5.00 05/13/2009 07:30 PM    HGB 15.7 05/13/2009 07:30 PM    HCT 44.4 05/13/2009 07:30 PM    MCV 88.8 05/13/2009 07:30 PM    MCH 31.4 05/13/2009 07:30 PM    MCHC 35.4 (H) 05/13/2009 07:30 PM    RDW 12.1 05/13/2009 07:30 PM    PLATELET 939 48/89/2067 07:30 PM     Assessment/Plan:  A valproic acid level was drawn on 11/29 @ 9076 and found to be 125 mcg/mL. This level is reflective of a steady state trough level.  The dose of divalproex was reduced to 1750mg as the patient is not acutely manic.      Juan Jose Jauregui, PharmD, BCPP, [facility]ist, [facility]

## 2020-11-30 NOTE — PROGRESS NOTES
PSYCHIATRIC PROGRESS NOTE       Patient: Derick Jensen MRN: [id number]  SSN: [id number]    YOB: 1985  Age: 28 y.o. Sex: male      Admit Date: 11/25/2020    LOS: 5 days       Chief Complaint:  Tired. Interval History:  Derick Jensen states he is feeling tired. Characterological features very prominent. Denies active thoughts of si, \"it would just be self harm but not suicidal.\" Denies hi or avh. Shelbi Hunter from yesterday. 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     GERD (gastroesophageal reflux disease)     HLD (hyperlipidemia)     HTN (hypertension)     Hypothyroid     MDD (major depressive disorder)     SANTIAGO on CPAP     Prediabetes          ALLERGIES:(reviewed/updated 11/30/2020)  Allergies   Allergen Reactions    Amoxicillin Unknown (comments)    Geodon [Ziprasidone Hcl] Unknown (comments)    Haloperidol Unknown (comments)    Lamictal [Lamotrigine] Unknown (comments)    Remeron [Mirtazapine] Unknown (comments)    Risperidone Unknown (comments)    Spironolactone Unknown (comments)       Laboratory report:  Lab Results   Component Value Date/Time    WBC 5.7 05/13/2009 07:30 PM    HGB 15.7 05/13/2009 07:30 PM    HCT 44.4 05/13/2009 07:30 PM    PLATELET 241 26/46/2668 07:30 PM    MCV 88.8 05/13/2009 07:30 PM      Lab Results   Component Value Date/Time    Sodium 141 05/13/2009 07:30 PM    Potassium 4.0 05/13/2009 07:30 PM    Chloride 105 05/13/2009 07:30 PM    CO2 31 05/13/2009 07:30 PM    Anion gap 5 05/13/2009 07:30 PM    Glucose 105 (H) 05/13/2009 07:30 PM    BUN 8 05/13/2009 07:30 PM    Creatinine 0.7 05/13/2009 07:30 PM    BUN/Creatinine ratio 11 (L) 05/13/2009 07:30 PM    GFR est AA >60 05/13/2009 07:30 PM    GFR est non-AA >60 05/13/2009 07:30 PM    Calcium 8.7 05/13/2009 07:30 PM    Bilirubin, total 0.3 05/13/2009 07:30 PM    Alk.  phosphatase 63 05/13/2009 07:30 PM    Protein, total 7.0 05/13/2009 07:30 PM Albumin 4.0 05/13/2009 07:30 PM    Globulin 3.0 05/13/2009 07:30 PM    A-G Ratio 1.3 05/13/2009 07:30 PM    ALT (SGPT) 42 05/13/2009 07:30 PM      Vitals:    11/29/20 0926 11/29/20 1600 11/29/20 1941 11/30/20 0741   BP: 139/81 (!) 138/99 (!) 139/92 (!) 147/94   Pulse:  78 71 65   Resp:  16 16 18   Temp:  97.6 °F (36.4 °C) 97.5 °F (36.4 °C) 97.3 °F (36.3 °C)   SpO2:  98% 96% 100%   Weight: 110 kg (242 lb 8.1 oz)      Height: 5' 4\" (1.626 m)          Lab Results   Component Value Date/Time    Valproic acid 125 (H) 11/29/2020 05:49 PM     No results found for: LITHM    Vital Signs  Patient Vitals for the past 24 hrs:   Temp Pulse Resp BP SpO2   11/30/20 0741 97.3 °F (36.3 °C) 65 18 (!) 147/94 100 %   11/29/20 1941 97.5 °F (36.4 °C) 71 16 (!) 139/92 96 %   11/29/20 1600 97.6 °F (36.4 °C) 78 16 (!) 138/99 98 %     Wt Readings from Last 3 Encounters:   11/29/20 110 kg (242 lb 8.1 oz)     Temp Readings from Last 3 Encounters:   11/30/20 97.3 °F (36.3 °C)     BP Readings from Last 3 Encounters:   11/30/20 (!) 147/94     Pulse Readings from Last 3 Encounters:   11/30/20 65       Radiology (reviewed/updated 11/30/2020)  No results found.     Current Facility-Administered Medications   Medication Dose Route Frequency Provider Last Rate Last Dose    OLANZapine (ZyPREXA) tablet 10 mg  10 mg Oral Q12H PRN Felecia Morgan, NP   10 mg at 11/29/20 2317    atorvastatin (LIPITOR) tablet 40 mg  40 mg Oral QHS Vik Domingo NP   40 mg at 11/29/20 2121    lithium carbonate SR (LITHOBID) tablet 300 mg  300 mg Oral Q12H Ashwini Dooley MD   300 mg at 11/30/20 5278    dicyclomine (BENTYL) capsule 10 mg  10 mg Oral QID Evelia Chino MD   10 mg at 11/30/20 0836    selenium sulfide (SELSUN BLUE) 1 % shampoo   Topical PRN Evelia Chino MD        hydrALAZINE (APRESOLINE) tablet 25 mg  25 mg Oral Q8H PRN Vik Domingo NP        benztropine (COGENTIN) tablet 1 mg  1 mg Oral BID PRN CHAPARRO Newman diphenhydrAMINE (BENADRYL) injection 50 mg  50 mg IntraMUSCular BID PRN Dewane Candy, NP        hydrOXYzine HCL (ATARAX) tablet 50 mg  50 mg Oral TID PRN Dewane Candy, NP   50 mg at 11/29/20 1717    LORazepam (ATIVAN) injection 1 mg  1 mg IntraMUSCular Q4H PRN Dewane Candy, NP        traZODone (DESYREL) tablet 50 mg  50 mg Oral QHS PRN Dewane Candy, NP   50 mg at 11/29/20 2121    acetaminophen (TYLENOL) tablet 650 mg  650 mg Oral Q4H PRN Dewane Candy, NP   650 mg at 11/26/20 2002    magnesium hydroxide (MILK OF MAGNESIA) 400 mg/5 mL oral suspension 30 mL  30 mL Oral DAILY PRN Dewane Candy, NP        OLANZapine (ZyPREXA) 5 mg in sterile water (preservative free) 1 mL injection  5 mg IntraMUSCular Q6H PRN Dewane Candy, NP        influenza vaccine 2020-21 (6 mos+)(PF) (FLUARIX/FLULAVAL/FLUZONE QUAD) injection 0.5 mL  0.5 mL IntraMUSCular PRIOR TO DISCHARGE Nolberto Wheeler MD        pantoprazole (PROTONIX) tablet 40 mg  40 mg Oral DAILY Nolberto Wheeler MD   40 mg at 11/30/20 0836    metoprolol tartrate (LOPRESSOR) tablet 25 mg  25 mg Oral BID Nolberto Wheeler MD   25 mg at 11/30/20 0836    fluticasone propionate (FLONASE) 50 mcg/actuation nasal spray 2 Spray  2 Spray Both Nostrils DAILY Nolberto Wheeler MD   2 [place] at 11/30/20 0836    albuterol (PROVENTIL HFA, VENTOLIN HFA, PROAIR HFA) inhaler 2 Puff  2 Puff Inhalation Q4H PRN Nolberto Wheeler MD        divalproex ER (DEPAKOTE ER) 24 hour tablet 2,000 mg  2,000 mg Oral QHS Lily BURKETT MD   2,000 mg at 11/29/20 2121    testosterone cypionate (DEPOTESTOTERONE CYPIONATE) injection 100 mg  100 mg Other every Wednesday Nolberto Wheeler MD   100 mg at 11/25/20 1357       Side Effects: (reviewed/updated 11/30/2020)  None reported or admitted to.     Review of Systems: (reviewed/updated 11/30/2020)  Appetite: good  Sleep: good   All other Review of Systems: negative    Mental Status Exam:  Eye contact: Good eye contact  Psychomotor activity: Relaxed   Speech is spontaneous  Thought process: Logical and goal directed   Mood is \"ok\"  Affect: mildly constricted  Perception: No avh  Suicidal ideation: No si  Homicidal ideation: No hi  Insight/judgment: Poor  Cognition is grossly intact      Physical Exam:  Musculoskeletal system: steady gait  Tremor not present  Cog wheeling not present      Assessment and Plan:  Hima Bryant meets criteria for diagnoses of Bipolar I disorder, most recent episode depression, without psychotic symptoms. Borderline personality disorder. History of obsessive-compulsive disorder. Decrease depakote to 1750mg. Continue rest of medications as prescribed. Lithium level in am.  We will closely monitor for safety. We will encourage reality orientation. Disposition planning to continue. I certify that this patients inpatient psychiatric hospital services furnished since the previous certification were, and continue to be, required for treatment that could reasonably be expected to improve the patient's condition, or for diagnostic study, and that the patient continues to need, on a daily basis, active treatment furnished directly by or requiring the supervision of inpatient psychiatric facility personnel. In addition, the hospital records show that services furnished were intensive treatment services, admission or related services, or equivalent services.       Signed:  Sukhi Moy NP  11/30/2020

## 2020-11-30 NOTE — PROGRESS NOTES
Problem: Depressed Mood (Adult/Pediatric)  Goal: *STG: Participates in treatment plan  Outcome: Progressing Towards Goal  Note: Out on unit engaged social. Mood depressed affect sad attitude defensive. Contributes attitude and chronic SI towards interactions w peer on unit. Denies SI w plan and verbalized insight of crisis vs chronic SI. States feeling safe however unsure if safe to d/c home. Daily goal is to get flu shot and cream ordered for his face.  Staff focus is on d/c planning   Goal: *STG: Verbalizes anger, guilt, and other feelings in a constructive manor  Outcome: Progressing Towards Goal  Goal: *STG: Attends activities and groups  Outcome: Progressing Towards Goal  Goal: *STG: Demonstrates reduction in symptoms and increase in insight into coping skills/future focused  Outcome: Progressing Towards Goal  Goal: Interventions  Outcome: Progressing Towards Goal

## 2020-11-30 NOTE — INTERDISCIPLINARY ROUNDS
Behavioral Health Interdisciplinary Rounds Patient Name: Arabella Longoria  Age: 28 y.o. Room/Bed:  733/ Primary Diagnosis: <principal problem not specified> Admission Status: Voluntary Readmission within 30 days: no 
Power of  in place: no 
Patient requires a blocked bed: no          Reason for blocked bed: VTE Prophylaxis: No 
 
Mobility needs/Fall risk: no 
Flu Vaccine : no  
Nutritional Plan: no 
Consults:         
Labs/Testing due today?: no 
 
Sleep hours:  6 Participation in Care/Groups:  yes Medication Compliant?: Yes PRNS (last 24 hours): Antipsychotic (PO), Antianxiety and Sleep Aid Restraints (last 24 hours):  no 
  
CIWA (range last 24 hours): COWS (range last 24 hours): Alcohol screening (AUDIT) completed -   AUDIT Score: 2 If applicable, date SBIRT discussed in treatment team AND documented:  
AUDIT Screen Score: AUDIT Score: 2 Document Brief Intervention (corresponds directly with the 5 A's, Ask, Advise, Assess, Assist, and Arrange): At- Risk Patients (Score 7-15 for women; 8-15 for men) Discuss concern patient is drinking at unhealthy levels known to increase risk of alcohol-related health problems. Is Patient ready to commit to change? If No: 
? Encourage reflection ? Discuss short term and long term health risks of consuming alcohol ? Barriers to change ? Reaffirm willingness to help / Educational materials provided If Yes: 
? Set goal 
? Plan 
? Educational materials provided Harmful use or Dependence (Score 16 or greater) ? Discuss short term and long term health risks of consuming alcohol ? Recommendations ? Negotiate drinking goal 
? Recommend addiction specialist/center ? Arrange follow-up appointments. Tobacco - patient is a smoker: Have You Used Tobacco in the Past 30 Days: No 
Illegal Drugs use: Have You Used Any Illegal Substances Over the Past 12 Months: Yes 
 
24 hour chart check complete: yes Patient goal(s) for today:  
Treatment team focus/goals:  
Progress note LOS:  5  Expected LOS:  
 
Financial concerns/prescription coverage:   
Family contact:      
Family requesting physician contact today:   
Discharge plan: Access to weapons :        
Outpatient provider(s):  
Patient's preferred phone number for follow up call :  
Patient's preferred e-mail address : 
Participating treatment team members: Lesia Monroe, * (assigned SW),

## 2020-11-30 NOTE — PROGRESS NOTES
Pt appears asleep. Respirations even and unlabored. No distress noted. Will continue to monitor q15 for safety. Problem: Falls - Risk of  Goal: *Absence of Falls  Description: Document Mary Heads Fall Risk and appropriate interventions in the flowsheet.   Outcome: Progressing Towards Goal  Note: Fall Risk Interventions:            Medication Interventions: Evaluate medications/consider consulting pharmacy, Teach patient to arise slowly

## 2020-11-30 NOTE — PROGRESS NOTES
Problem: Depressed Mood (Adult/Pediatric)  Goal: *STG: Participates in treatment plan  Outcome: Progressing Towards Goal  Goal: *STG: Verbalizes anger, guilt, and other feelings in a constructive manor  Outcome: Progressing Towards Goal  Goal: *STG: Attends activities and groups  Outcome: Progressing Towards Goal  Goal: *STG: Demonstrates reduction in symptoms and increase in insight into coping skills/future focused  Outcome: Progressing Towards Goal  Goal: Interventions  Outcome: Progressing Towards Goal   Pt will verbalized increased s/sx of increased depressed mood  Pt  is engaged on unit

## 2020-12-01 LAB
DATE LAST DOSE: ABNORMAL
LITHIUM SERPL-SCNC: 0.44 MMOL/L (ref 0.6–1.2)
REPORTED DOSE,DOSE: ABNORMAL UNITS
REPORTED DOSE/TIME,TMG: ABNORMAL

## 2020-12-01 PROCEDURE — 65220000003 HC RM SEMIPRIVATE PSYCH

## 2020-12-01 PROCEDURE — 74011250637 HC RX REV CODE- 250/637: Performed by: NURSE PRACTITIONER

## 2020-12-01 PROCEDURE — 80178 ASSAY OF LITHIUM: CPT

## 2020-12-01 PROCEDURE — 74011250637 HC RX REV CODE- 250/637: Performed by: PSYCHIATRY & NEUROLOGY

## 2020-12-01 PROCEDURE — 36415 COLL VENOUS BLD VENIPUNCTURE: CPT

## 2020-12-01 RX ORDER — METOPROLOL TARTRATE 25 MG/1
25 TABLET, FILM COATED ORAL ONCE
Status: COMPLETED | OUTPATIENT
Start: 2020-12-01 | End: 2020-12-01

## 2020-12-01 RX ORDER — METOPROLOL TARTRATE 25 MG/1
50 TABLET, FILM COATED ORAL 2 TIMES DAILY
Status: DISCONTINUED | OUTPATIENT
Start: 2020-12-01 | End: 2020-12-03 | Stop reason: HOSPADM

## 2020-12-01 RX ORDER — LITHIUM CARBONATE 300 MG/1
300 TABLET, FILM COATED, EXTENDED RELEASE ORAL 3 TIMES DAILY
Status: DISCONTINUED | OUTPATIENT
Start: 2020-12-01 | End: 2020-12-03 | Stop reason: HOSPADM

## 2020-12-01 RX ADMIN — FLUTICASONE PROPIONATE 2 SPRAY: 50 SPRAY, METERED NASAL at 08:24

## 2020-12-01 RX ADMIN — LITHIUM CARBONATE 300 MG: 300 TABLET, EXTENDED RELEASE ORAL at 08:24

## 2020-12-01 RX ADMIN — PANTOPRAZOLE SODIUM 40 MG: 40 TABLET, DELAYED RELEASE ORAL at 08:24

## 2020-12-01 RX ADMIN — DICYCLOMINE HYDROCHLORIDE 10 MG: 10 CAPSULE ORAL at 15:35

## 2020-12-01 RX ADMIN — METOPROLOL TARTRATE 25 MG: 25 TABLET, FILM COATED ORAL at 08:24

## 2020-12-01 RX ADMIN — ATORVASTATIN CALCIUM 40 MG: 40 TABLET, FILM COATED ORAL at 20:52

## 2020-12-01 RX ADMIN — METOPROLOL TARTRATE 50 MG: 25 TABLET, FILM COATED ORAL at 17:24

## 2020-12-01 RX ADMIN — METOPROLOL TARTRATE 25 MG: 25 TABLET, FILM COATED ORAL at 13:26

## 2020-12-01 RX ADMIN — TRIAMCINOLONE ACETONIDE: 1 CREAM TOPICAL at 20:54

## 2020-12-01 RX ADMIN — DIVALPROEX SODIUM 1750 MG: 250 TABLET, EXTENDED RELEASE ORAL at 20:53

## 2020-12-01 RX ADMIN — LITHIUM CARBONATE 300 MG: 300 TABLET, EXTENDED RELEASE ORAL at 20:54

## 2020-12-01 RX ADMIN — TRAZODONE HYDROCHLORIDE 50 MG: 50 TABLET ORAL at 20:53

## 2020-12-01 RX ADMIN — LITHIUM CARBONATE 300 MG: 300 TABLET, EXTENDED RELEASE ORAL at 17:24

## 2020-12-01 RX ADMIN — ACETAMINOPHEN 650 MG: 325 TABLET ORAL at 20:53

## 2020-12-01 RX ADMIN — HYDROXYZINE HYDROCHLORIDE 50 MG: 50 TABLET, FILM COATED ORAL at 20:54

## 2020-12-01 NOTE — PROGRESS NOTES
Problem: Falls - Risk of  Goal: *Absence of Falls  Description: Document Larisa El Fall Risk and appropriate interventions in the flowsheet. Outcome: Progressing Towards Goal  Note: Fall Risk Interventions:  Mobility Interventions: Assess mobility with egress test         Medication Interventions: Teach patient to arise slowly    Elimination Interventions:  Toilet paper/wipes in reach    History of Falls Interventions: Door open when patient unattended

## 2020-12-01 NOTE — PROGRESS NOTES
Problem: Depressed Mood (Adult/Pediatric)  Goal: *STG: Participates in treatment plan  Outcome: Progressing Towards Goal  Note: Out on unit passively engaged. Mood and affect irritable, defensive and blaming others. Denies SI w plan. Fleeting SI with no plans are chronic.  Daily goal is to talk about medications staff focus is on limit setting and medication education  Goal: *STG: Verbalizes anger, guilt, and other feelings in a constructive manor  Outcome: Progressing Towards Goal  Goal: *STG: Attends activities and groups  Outcome: Progressing Towards Goal  Goal: *STG: Demonstrates reduction in symptoms and increase in insight into coping skills/future focused  Outcome: Progressing Towards Goal  Goal: Interventions  Outcome: Progressing Towards Goal

## 2020-12-01 NOTE — PROGRESS NOTES
Laboratory Monitoring for Lithium: This patient is currently prescribed the following medication(s):   Current Facility-Administered Medications   Medication Dose Route Frequency    divalproex ER (DEPAKOTE ER) 24 hour tablet 1,750 mg  1,750 mg Oral QHS    atorvastatin (LIPITOR) tablet 40 mg  40 mg Oral QHS    lithium carbonate SR (LITHOBID) tablet 300 mg  300 mg Oral Q12H    influenza vaccine 2020-21 (6 mos+)(PF) (FLUARIX/FLULAVAL/FLUZONE QUAD) injection 0.5 mL  0.5 mL IntraMUSCular PRIOR TO DISCHARGE    pantoprazole (PROTONIX) tablet 40 mg  40 mg Oral DAILY    metoprolol tartrate (LOPRESSOR) tablet 25 mg  25 mg Oral BID    fluticasone propionate (FLONASE) 50 mcg/actuation nasal spray 2 Spray  2 Spray Both Nostrils DAILY    testosterone cypionate (DEPOTESTOTERONE CYPIONATE) injection 100 mg  100 mg Other every Wednesday     The following labs have been completed for monitoring of lithium:    Lithium Serum Concentration  Lab Results   Component Value Date/Time    Lithium level 0.44 (L) 12/01/2020 06:14 AM     Renal Function and Chemistry  Lab Results   Component Value Date/Time    Sodium 141 05/13/2009 07:30 PM    Potassium 4.0 05/13/2009 07:30 PM    Chloride 105 05/13/2009 07:30 PM    CO2 31 05/13/2009 07:30 PM    Anion gap 5 05/13/2009 07:30 PM    BUN 8 05/13/2009 07:30 PM    Creatinine 0.7 05/13/2009 07:30 PM    BUN/Creatinine ratio 11 (L) 05/13/2009 07:30 PM     Assessment/Plan:  A lithium level was drawn on 12/1 @ 0614 and found to be 0.44 mmol/L. This level was drawn ~9 hr following the previous dose and is reflective of steady state trough (after @ least 4 days of therapy). Target lithium level should be lower than for acute nilsa (~0.6-0.8 mmol/L). This level is considered subtherapeutic. Would recommend increasing to lithium 300mg TID.      Hammad Morgan, PharmD, BCPP, [facility], [facility]

## 2020-12-01 NOTE — PROGRESS NOTES
Patient educated on fall precautions while taking sedative medications. Will continue to educate and monitor. Problem: Falls - Risk of  Goal: *Absence of Falls  Description: Document Lexa Casiano Fall Risk and appropriate interventions in the flowsheet. Outcome: Progressing Towards Goal  Note: Fall Risk Interventions:  Mobility Interventions: Assess mobility with egress test         Medication Interventions: Teach patient to arise slowly, Evaluate medications/consider consulting pharmacy    Elimination Interventions:  Toilet paper/wipes in reach    History of Falls Interventions: Door open when patient unattended

## 2020-12-01 NOTE — INTERDISCIPLINARY ROUNDS
Behavioral Health Interdisciplinary Rounds Patient Name: Ramakrishna Kidd  Age: 28 y.o. Room/Bed:  733/ Primary Diagnosis: <principal problem not specified> Admission Status: Voluntary Readmission within 30 days: no 
Power of  in place: no 
Patient requires a blocked bed: no          Reason for blocked bed: VTE Prophylaxis: No 
 
Mobility needs/Fall risk: no 
Flu Vaccine : no  
Nutritional Plan: no 
Consults:         
Labs/Testing due today?: yes Sleep hours:  7 Participation in Care/Groups:  yes Medication Compliant?: Yes PRNS (last 24 hours): Sleep Aid Restraints (last 24 hours):  no 
  
CIWA (range last 24 hours): COWS (range last 24 hours): Alcohol screening (AUDIT) completed -   AUDIT Score: 2 If applicable, date SBIRT discussed in treatment team AND documented:  
AUDIT Screen Score: AUDIT Score: 2 Document Brief Intervention (corresponds directly with the 5 A's, Ask, Advise, Assess, Assist, and Arrange): At- Risk Patients (Score 7-15 for women; 8-15 for men) Discuss concern patient is drinking at unhealthy levels known to increase risk of alcohol-related health problems. Is Patient ready to commit to change? If No: 
? Encourage reflection ? Discuss short term and long term health risks of consuming alcohol ? Barriers to change ? Reaffirm willingness to help / Educational materials provided If Yes: 
? Set goal 
? Plan 
? Educational materials provided Harmful use or Dependence (Score 16 or greater) ? Discuss short term and long term health risks of consuming alcohol ? Recommendations ? Negotiate drinking goal 
? Recommend addiction specialist/center ? Arrange follow-up appointments. Tobacco - patient is a smoker: Have You Used Tobacco in the Past 30 Days: No 
Illegal Drugs use: Have You Used Any Illegal Substances Over the Past 12 Months: Yes 
 
24 hour chart check complete: yes Patient goal(s) for today: Attend groups  
 Treatment team focus/goals: medication mgmt Progress note:  Pt reports mood as \"Not bad - so far no complaints. \"  He denies SI/HI/AVH and states he feels more stable today. He will discharge Wednesday or Thursday and follow-up with [facility]; Dr. Fadumo Patel for med mgmt, and Roderick Velasco for counseling.   
  
LOS: 6                        Expected LOS: 
  
Financial concerns/prescription coverage:  No (medicare) Family contact: No contact                              
Family requesting physician contact today:  no 
Discharge plan: Discharge home and follow-up with [facility] Access to weapons : XR                                                           
Outpatient provider(s): [facility] Patient's preferred phone number for follow up call : [phone] [facility]) Patient's preferred e-mail address : 
Participating treatment team members: Hima Charlton

## 2020-12-01 NOTE — PROGRESS NOTES
PSYCHIATRIC PROGRESS NOTE       Patient: Maurine Brunner MRN: [id number]  SSN: [id number]    YOB: 1985  Age: 28 y.o. Sex: male      Admit Date: 11/25/2020    LOS: 6 days       Chief Complaint:  I am feeling good today. Interval History:  Maurine Brunner states he is feeling good today. States he hasn't had thoughts of hurting himself in the last 24 hours which he says is an improvement for him. He is much calmer. His lithium level 0.44, he is receptive in increasing it to 300mg tid. He denies hi or avh. He has attended some groups. He is tolerating his meds and denies side effects.       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     GERD (gastroesophageal reflux disease)     HLD (hyperlipidemia)     HTN (hypertension)     Hypothyroid     MDD (major depressive disorder)     SANTIAGO on CPAP     Prediabetes          ALLERGIES:(reviewed/updated 12/1/2020)  Allergies   Allergen Reactions    Amoxicillin Unknown (comments)    Geodon [Ziprasidone Hcl] Unknown (comments)    Haloperidol Unknown (comments)    Lamictal [Lamotrigine] Unknown (comments)    Remeron [Mirtazapine] Unknown (comments)    Risperidone Unknown (comments)    Spironolactone Unknown (comments)       Laboratory report:  Lab Results   Component Value Date/Time    WBC 5.7 05/13/2009 07:30 PM    HGB 15.7 05/13/2009 07:30 PM    HCT 44.4 05/13/2009 07:30 PM    PLATELET 640 53/35/5840 07:30 PM    MCV 88.8 05/13/2009 07:30 PM      Lab Results   Component Value Date/Time    Sodium 141 05/13/2009 07:30 PM    Potassium 4.0 05/13/2009 07:30 PM    Chloride 105 05/13/2009 07:30 PM    CO2 31 05/13/2009 07:30 PM    Anion gap 5 05/13/2009 07:30 PM    Glucose 105 (H) 05/13/2009 07:30 PM    BUN 8 05/13/2009 07:30 PM    Creatinine 0.7 05/13/2009 07:30 PM    BUN/Creatinine ratio 11 (L) 05/13/2009 07:30 PM    GFR est AA >60 05/13/2009 07:30 PM    GFR est non-AA >60 05/13/2009 07:30 PM    Calcium 8.7 05/13/2009 07:30 PM    Bilirubin, total 0.3 05/13/2009 07:30 PM    Alk. phosphatase 63 05/13/2009 07:30 PM    Protein, total 7.0 05/13/2009 07:30 PM    Albumin 4.0 05/13/2009 07:30 PM    Globulin 3.0 05/13/2009 07:30 PM    A-G Ratio 1.3 05/13/2009 07:30 PM    ALT (SGPT) 42 05/13/2009 07:30 PM      Vitals:    11/30/20 0741 11/30/20 1619 11/30/20 2008 12/01/20 0810   BP: (!) 147/94 (!) 142/99 (!) 133/90 (!) 167/77   Pulse: 65 81 75 92   Resp: 18 16 16 18   Temp: 97.3 °F (36.3 °C) 98 °F (36.7 °C) 98 °F (36.7 °C) 97.6 °F (36.4 °C)   SpO2: 100% 98%     Weight:       Height:           Lab Results   Component Value Date/Time    Valproic acid 125 (H) 11/29/2020 05:49 PM     Lab Results   Component Value Date/Time    Lithium level 0.44 (L) 12/01/2020 06:14 AM       Vital Signs  Patient Vitals for the past 24 hrs:   Temp Pulse Resp BP SpO2   12/01/20 0810 97.6 °F (36.4 °C) 92 18 (!) 167/77    11/30/20 2008 98 °F (36.7 °C) 75 16 (!) 133/90    11/30/20 1619 98 °F (36.7 °C) 81 16 (!) 142/99 98 %     Wt Readings from Last 3 Encounters:   11/29/20 110 kg (242 lb 8.1 oz)     Temp Readings from Last 3 Encounters:   12/01/20 97.6 °F (36.4 °C)     BP Readings from Last 3 Encounters:   12/01/20 (!) 167/77     Pulse Readings from Last 3 Encounters:   12/01/20 92       Radiology (reviewed/updated 12/1/2020)  No results found.     Current Facility-Administered Medications   Medication Dose Route Frequency Provider Last Rate Last Dose    lithium carbonate SR (LITHOBID) tablet 300 mg  300 mg Oral TID Jihan Naqvi NP        metoprolol tartrate (LOPRESSOR) tablet 50 mg  50 mg Oral BID Jihan Naqvi NP        metoprolol tartrate (LOPRESSOR) tablet 25 mg  25 mg Oral ONCE Jiahn Naqvi NP        divalproex ER (DEPAKOTE ER) 24 hour tablet 1,750 mg  1,750 mg Oral QHS Jihan Naqvi NP   1,750 mg at 11/30/20 2120    dicyclomine (BENTYL) capsule 10 mg  10 mg Oral Q6H PRN Khoi Schwarz NP        triamcinolone acetonide (KENALOG) 0.1 % cream   Topical DAILY PRN Jihan Naqvi, NP        OLANZapine (ZyPREXA) tablet 10 mg  10 mg Oral Q12H PRN Zaire Galvez NP   10 mg at 11/29/20 2317    atorvastatin (LIPITOR) tablet 40 mg  40 mg Oral QHS Arlen Castillo, NP   40 mg at 11/30/20 2119    selenium sulfide (SELSUN BLUE) 1 % shampoo   Topical PRN Daphney Gutierrez MD        hydrALAZINE (APRESOLINE) tablet 25 mg  25 mg Oral Q8H PRN Arlen Castillo, NP        benztropine (COGENTIN) tablet 1 mg  1 mg Oral BID PRN Leo Daub, NP        diphenhydrAMINE (BENADRYL) injection 50 mg  50 mg IntraMUSCular BID PRN Leo Daub, NP        hydrOXYzine HCL (ATARAX) tablet 50 mg  50 mg Oral TID PRN [place] Daub, NP   50 mg at 11/29/20 1717    LORazepam (ATIVAN) injection 1 mg  1 mg IntraMUSCular Q4H PRN [place] Daub, NP        traZODone (DESYREL) tablet 50 mg  50 mg Oral QHS PRN [place] Daub, NP   50 mg at 11/30/20 2121    acetaminophen (TYLENOL) tablet 650 mg  650 mg Oral Q4H PRN Leo Daub, NP   650 mg at 11/26/20 2002    magnesium hydroxide (MILK OF MAGNESIA) 400 mg/5 mL oral suspension 30 mL  30 mL Oral DAILY PRN Leo Daub, NP        OLANZapine (ZyPREXA) 5 mg in sterile water (preservative free) 1 mL injection  5 mg IntraMUSCular Q6H PRN Leo Daub, NP        influenza vaccine 2020-21 (6 mos+)(PF) (FLUARIX/FLULAVAL/FLUZONE QUAD) injection 0.5 mL  0.5 mL IntraMUSCular PRIOR TO DISCHARGE Karina Rainey MD        pantoprazole (PROTONIX) tablet 40 mg  40 mg Oral DAILY Karina Rainey MD   40 mg at 12/01/20 0824    fluticasone propionate (FLONASE) 50 mcg/actuation nasal spray 2 Spray  2 Spray Both Nostrils DAILY Karina Rainey MD   2 [place] at 12/01/20 0824    albuterol (PROVENTIL HFA, VENTOLIN HFA, PROAIR HFA) inhaler 2 Puff  2 Puff Inhalation Q4H PRN Karina Rainey MD        testosterone cypionate (DEPOTESTOTERONE CYPIONATE) injection 100 mg  100 mg Other every Wednesday Chintan Rain MD   100 mg at 11/25/20 8778       Side Effects: (reviewed/updated 12/1/2020)  None reported or admitted to. Review of Systems: (reviewed/updated 12/1/2020)  Appetite: good  Sleep: good   All other Review of Systems: negative    Mental Status Exam:  Eye contact: Good eye contact  Psychomotor activity: Relaxed   Speech is spontaneous  Thought process: Logical and goal directed   Mood is \"good\"  Affect: full  Perception: No avh  Suicidal ideation: No si  Homicidal ideation: No hi  Insight/judgment: Partial  Cognition is grossly intact      Physical Exam:  Musculoskeletal system: steady gait  Tremor not present  Cog wheeling not present      Assessment and Plan:  Ray Dyanna Lanes meets criteria for diagnoses of Bipolar I disorder, most recent episode depression, without psychotic symptoms. Borderline personality disorder. History of obsessive-compulsive disorder. Increase lithium 300mg tid. Continue rest of medications as prescribed. We will closely monitor for safety. We will encourage reality orientation. Plan for dc in the next 24-48 hours. I certify that this patients inpatient psychiatric hospital services furnished since the previous certification were, and continue to be, required for treatment that could reasonably be expected to improve the patient's condition, or for diagnostic study, and that the patient continues to need, on a daily basis, active treatment furnished directly by or requiring the supervision of inpatient psychiatric facility personnel. In addition, the hospital records show that services furnished were intensive treatment services, admission or related services, or equivalent services.       Signed:  Meaghan Plaza NP  12/1/2020

## 2020-12-01 NOTE — BH NOTES
Pt asked regarding flu vaccine this pm.  Pt declined this evening. pt states\"maybe in the morning? \" pt did request kenalog cream ,c/o of facial irritation.

## 2020-12-02 PROCEDURE — 74011250636 HC RX REV CODE- 250/636: Performed by: PSYCHIATRY & NEUROLOGY

## 2020-12-02 PROCEDURE — 36415 COLL VENOUS BLD VENIPUNCTURE: CPT

## 2020-12-02 PROCEDURE — 74011250637 HC RX REV CODE- 250/637: Performed by: NURSE PRACTITIONER

## 2020-12-02 PROCEDURE — 74011250637 HC RX REV CODE- 250/637: Performed by: PSYCHIATRY & NEUROLOGY

## 2020-12-02 PROCEDURE — 65220000003 HC RM SEMIPRIVATE PSYCH

## 2020-12-02 PROCEDURE — 80048 BASIC METABOLIC PNL TOTAL CA: CPT

## 2020-12-02 RX ADMIN — DICYCLOMINE HYDROCHLORIDE 10 MG: 10 CAPSULE ORAL at 18:09

## 2020-12-02 RX ADMIN — METOPROLOL TARTRATE 50 MG: 25 TABLET, FILM COATED ORAL at 08:38

## 2020-12-02 RX ADMIN — TRAZODONE HYDROCHLORIDE 50 MG: 50 TABLET ORAL at 23:51

## 2020-12-02 RX ADMIN — METOPROLOL TARTRATE 50 MG: 25 TABLET, FILM COATED ORAL at 18:09

## 2020-12-02 RX ADMIN — TESTOSTERONE CYPIONATE 100 MG: 200 INJECTION, SOLUTION INTRAMUSCULAR at 11:42

## 2020-12-02 RX ADMIN — LITHIUM CARBONATE 300 MG: 300 TABLET, EXTENDED RELEASE ORAL at 08:38

## 2020-12-02 RX ADMIN — HYDROXYZINE HYDROCHLORIDE 50 MG: 50 TABLET, FILM COATED ORAL at 23:51

## 2020-12-02 RX ADMIN — LITHIUM CARBONATE 300 MG: 300 TABLET, EXTENDED RELEASE ORAL at 18:09

## 2020-12-02 RX ADMIN — DIVALPROEX SODIUM 1750 MG: 250 TABLET, EXTENDED RELEASE ORAL at 21:21

## 2020-12-02 RX ADMIN — LITHIUM CARBONATE 300 MG: 300 TABLET, EXTENDED RELEASE ORAL at 21:21

## 2020-12-02 RX ADMIN — PANTOPRAZOLE SODIUM 40 MG: 40 TABLET, DELAYED RELEASE ORAL at 08:38

## 2020-12-02 RX ADMIN — ATORVASTATIN CALCIUM 40 MG: 40 TABLET, FILM COATED ORAL at 21:21

## 2020-12-02 RX ADMIN — FLUTICASONE PROPIONATE 2 SPRAY: 50 SPRAY, METERED NASAL at 08:39

## 2020-12-02 NOTE — GROUP NOTE
[facility] GROUP DOCUMENTATION INDIVIDUAL                                                                          Group Therapy Note    Date: 12/2/2020    Group Start Time: 0930  Group End Time: 0512  Group Topic: Social Work Group    [email or web address] 7W GEN BEHAV [facility]    [place] [facility] GROUP DOCUMENTATION GROUP    Group Therapy Note    Attendees: 6         Attendance: Did not attend    Patient's Goal:      Interventions/techniques: Follows Directions:     Interactions:     Mental Status:     Behavior/appearance:     Goals Achieved:        Additional Notes:      Varun Troncoso

## 2020-12-02 NOTE — INTERDISCIPLINARY ROUNDS
Behavioral Health Interdisciplinary Rounds Patient Name: Bradford Keyes  Age: 28 y.o. Room/Bed:  733/01 Primary Diagnosis: <principal problem not specified> Admission Status: Voluntary Readmission within 30 days: no 
Power of  in place: no 
Patient requires a blocked bed: no          Reason for blocked bed:  
Sleep hours:  7.5 Participation in Care/Groups Medication Compliant?: Yes PRNS (last 24 hours): Antianxiety, Sleep Aid, Pain and bentyl Restraints (last 24 hours):  no 
  
Alcohol screening (AUDIT) completed -  AUDIT Score: 2  If applicable, date SBIRT discussed in treatment team AND documented:   
Tobacco - patient is a smoker: Have You Used Tobacco in the Past 30 Days: No 
Illegal Drugs use: Have You Used Any Illegal Substances Over the Past 12 Months: Yes 
 
24 hour chart check complete:  
 
Patient goal(s) for today: Attend groups  
Treatment team focus/goals: medication mgmt Progress note:  Pt reports feeling better - affect brighter and smiling. He states he feels like himself again. He denies SI/HI/AVH. He will discharge Thursday and follow-up with [facility]; Dr. Amos Moore for med mgmt, and Sihvam Velez for counseling.   
  
LOS:  7                       Expected LOS: 
  
Financial concerns/prescription coverage:  No (medicare) Family contact: No contact                              
Family requesting physician contact today:  no 
Discharge plan: Discharge home and follow-up with [facility] Access to weapons : PJ                                                           
Outpatient provider(s): [facility] Patient's preferred phone number for follow up call : [phone] [facility]) Patient's preferred e-mail address : 
Participating treatment team members: Hima Vazquez; TROY Johnson; Pawel Gates RN; Wilman Garcia [place]; Dr. Brenner

## 2020-12-02 NOTE — PROGRESS NOTES
PSYCHIATRIC PROGRESS NOTE       Patient: Lesia Monroe MRN: [id number]  SSN: [id number]    YOB: 1985  Age: 28 y.o. Sex: male      Admit Date: 11/25/2020    LOS: 7 days       Chief Complaint:  I have forgotten what it is to feel good. Interval History:  Lesia Monroe is slowly improving. Says he feels somewhat groggy today as he slept poorly last night. Mood is \"better and getting better\". Feels Lithium has not caused any side effects and does not have a tremor on exam. Pleasant and calm during the interview. No AH or VH. No SI or plan.      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     GERD (gastroesophageal reflux disease)     HLD (hyperlipidemia)     HTN (hypertension)     Hypothyroid     MDD (major depressive disorder)     SANTIAGO on CPAP     Prediabetes          ALLERGIES:(reviewed/updated 12/2/2020)  Allergies   Allergen Reactions    Amoxicillin Unknown (comments)    Geodon [Ziprasidone Hcl] Unknown (comments)    Haloperidol Unknown (comments)    Lamictal [Lamotrigine] Unknown (comments)    Remeron [Mirtazapine] Unknown (comments)    Risperidone Unknown (comments)    Spironolactone Unknown (comments)       Laboratory report:  Lab Results   Component Value Date/Time    WBC 5.7 05/13/2009 07:30 PM    HGB 15.7 05/13/2009 07:30 PM    HCT 44.4 05/13/2009 07:30 PM    PLATELET 010 72/90/7084 07:30 PM    MCV 88.8 05/13/2009 07:30 PM      Lab Results   Component Value Date/Time    Sodium 142 12/02/2020 06:00 AM    Potassium 3.7 12/02/2020 06:00 AM    Chloride 106 12/02/2020 06:00 AM    CO2 31 12/02/2020 06:00 AM    Anion gap 5 12/02/2020 06:00 AM    Glucose 84 12/02/2020 06:00 AM    BUN 8 12/02/2020 06:00 AM    Creatinine 0.66 (L) 12/02/2020 06:00 AM    BUN/Creatinine ratio 12 12/02/2020 06:00 AM    GFR est AA >60 12/02/2020 06:00 AM    GFR est non-AA >60 12/02/2020 06:00 AM    Calcium 8.9 12/02/2020 06:00 AM    Bilirubin, total 0.3 05/13/2009 07:30 PM    Alk. phosphatase 63 05/13/2009 07:30 PM    Protein, total 7.0 05/13/2009 07:30 PM    Albumin 4.0 05/13/2009 07:30 PM    Globulin 3.0 05/13/2009 07:30 PM    A-G Ratio 1.3 05/13/2009 07:30 PM    ALT (SGPT) 42 05/13/2009 07:30 PM      Vitals:    12/01/20 0810 12/01/20 1541 12/01/20 2015 12/02/20 0832   BP: (!) 167/77 (!) 137/91 135/70 (!) 158/94   Pulse: 92 74 73 69   Resp: 18 14 12 16   Temp: 97.6 °F (36.4 °C) 97.3 °F (36.3 °C) 97.9 °F (36.6 °C)    SpO2:  94% 95% 99%   Weight:       Height:           Lab Results   Component Value Date/Time    Valproic acid 125 (H) 11/29/2020 05:49 PM     Lab Results   Component Value Date/Time    Lithium level 0.44 (L) 12/01/2020 06:14 AM       Vital Signs  Patient Vitals for the past 24 hrs:   Temp Pulse Resp BP SpO2   12/02/20 0832  69 16 (!) 158/94 99 %   12/01/20 2015 97.9 °F (36.6 °C) 73 12 135/70 95 %   12/01/20 1541 97.3 °F (36.3 °C) 74 14 (!) 137/91 94 %     Wt Readings from Last 3 Encounters:   11/29/20 110 kg (242 lb 8.1 oz)     Temp Readings from Last 3 Encounters:   12/01/20 97.9 °F (36.6 °C)     BP Readings from Last 3 Encounters:   12/02/20 (!) 158/94     Pulse Readings from Last 3 Encounters:   12/02/20 69       Radiology (reviewed/updated 12/2/2020)  No results found.     Current Facility-Administered Medications   Medication Dose Route Frequency Provider Last Rate Last Dose    lithium carbonate SR (LITHOBID) tablet 300 mg  300 mg Oral TID Krysten Naqvie A, NP   300 mg at 12/02/20 0838    metoprolol tartrate (LOPRESSOR) tablet 50 mg  50 mg Oral BID Jihan Naqvi A, NP   50 mg at 12/02/20 0838    divalproex ER (DEPAKOTE ER) 24 hour tablet 1,750 mg  1,750 mg Oral QHS Jihan Naqvi NP   1,750 mg at 12/01/20 2053    dicyclomine (BENTYL) capsule 10 mg  10 mg Oral Q6H PRN Jihan Naqvi NP   10 mg at 12/01/20 1535    triamcinolone acetonide (KENALOG) 0.1 % cream   Topical DAILY PRN Dayanna Espinosa NP        OLANZapine (ZyPREXA) tablet 10 mg  10 mg Oral Q12H PRN Levorroberto Borden, NP   10 mg at 11/29/20 2317    atorvastatin (LIPITOR) tablet 40 mg  40 mg Oral QHS Bartolo Neal NP   40 mg at 12/01/20 2052    selenium sulfide (SELSUN BLUE) 1 % shampoo   Topical PRN Eleonora Woodard MD        hydrALAZINE (APRESOLINE) tablet 25 mg  25 mg Oral Q8H PRN Bartolo Neal NP        benztropine (COGENTIN) tablet 1 mg  1 mg Oral BID PRN Jefe Damon NP        diphenhydrAMINE (BENADRYL) injection 50 mg  50 mg IntraMUSCular BID PRN Jefe Damon NP        hydrOXYzine HCL (ATARAX) tablet 50 mg  50 mg Oral TID PRN Jefe Damon NP   50 mg at 12/01/20 2054    LORazepam (ATIVAN) injection 1 mg  1 mg IntraMUSCular Q4H PRN Jefe Damon, NP        traZODone (DESYREL) tablet 50 mg  50 mg Oral QHS PRN Jefe Damon NP   50 mg at 12/01/20 2053    acetaminophen (TYLENOL) tablet 650 mg  650 mg Oral Q4H PRN Jefe Damon NP   650 mg at 12/01/20 2053    magnesium hydroxide (MILK OF MAGNESIA) 400 mg/5 mL oral suspension 30 mL  30 mL Oral DAILY PRN Jefe Damon NP        OLANZapine (ZyPREXA) 5 mg in sterile water (preservative free) 1 mL injection  5 mg IntraMUSCular Q6H PRN Jefe Damon NP        influenza vaccine 2020-21 (6 mos+)(PF) (FLUARIX/FLULAVAL/FLUZONE QUAD) injection 0.5 mL  0.5 mL IntraMUSCular PRIOR TO DISCHARGE Macarena Vickers MD        pantoprazole (PROTONIX) tablet 40 mg  40 mg Oral DAILY Macarena Vickers MD   40 mg at 12/02/20 0838    fluticasone propionate (FLONASE) 50 mcg/actuation nasal spray 2 Spray  2 Spray Both Nostrils DAILY Macarena Vickers MD   2 Spray at 12/02/20 0839    albuterol (PROVENTIL HFA, VENTOLIN HFA, PROAIR HFA) inhaler 2 Puff  2 Puff Inhalation Q4H PRN Macarena Vickers MD        testosterone cypionate (DEPOTESTOTERONE CYPIONATE) injection 100 mg  100 mg Other every Wednesday Macarena Vickers MD   100 mg at 11/25/20 4651       Side Effects: (reviewed/updated 12/2/2020)  None reported or admitted to. Review of Systems: (reviewed/updated 12/2/2020)  Appetite: good  Sleep: good   All other Review of Systems: negative    Mental Status Exam:  Eye contact: Good eye contact  Psychomotor activity: Relaxed   Speech is spontaneous  Thought process: Logical and goal directed   Mood is \"good\"  Affect: full  Perception: No avh  Suicidal ideation: No si  Homicidal ideation: No hi  Insight/judgment: Partial  Cognition is grossly intact      Physical Exam:  Musculoskeletal system: steady gait  Tremor not present  Cog wheeling not present      Assessment and Plan:  Hima Pemberton Fairly meets criteria for diagnoses of Bipolar I disorder, most recent episode depression, without psychotic symptoms. Borderline personality disorder. History of obsessive-compulsive disorder. Continue rest of medications as prescribed. We will closely monitor for safety. We will encourage reality orientation. Plan for DC tomorrow. I certify that this patients inpatient psychiatric hospital services furnished since the previous certification were, and continue to be, required for treatment that could reasonably be expected to improve the patient's condition, or for diagnostic study, and that the patient continues to need, on a daily basis, active treatment furnished directly by or requiring the supervision of inpatient psychiatric facility personnel. In addition, the hospital records show that services furnished were intensive treatment services, admission or related services, or equivalent services.       Signed:  Cinda Potter MD  12/2/2020

## 2020-12-02 NOTE — GROUP NOTE
IP  GROUP DOCUMENTATION INDIVIDUAL                                                                          Group Therapy Note    Date: 12/2/2020    Group Start Time: 1400  Group End Time: 0348  Group Topic: Social Work Group    [facility] 7W GEN BEHAV [place]    IP [address] [facility] GROUP DOCUMENTATION GROUP    Group Therapy Note    Attendees: 5         Attendance: Attended    Patient's Goal:  Patient will complete the [place] to identify their behaviors, what feeds their behaviors, and the roots of their issues. Patient will be able to self-reflect on the causes of what has been their \"dirt\" or surroundings and what needs to be changed in order to move forward into growth. Patient will be open to feedback from group, and be actively engaged in group discussion. Interventions/techniques: Art integration, Challenged, Informed, Validated, Promoted peer support, Provide feedback, Reinforced and Supported    Follows Directions: Followed directions    Interactions: Interacted appropriately    Mental Status: Calm and Congruent    Behavior/appearance: Attentive, Cooperative and Enthusiastic    Goals Achieved: Able to engage in interactions, Able to listen to others, Able to give feedback to another, Able to reflect/comment on own behavior, Able to manage/cope with feelings, Able to receive feedback, Able to self-disclose and Discussed self-esteem issues      Additional Notes:  Patient was actively engaged in group discussion and was able to identify areas where he \"feeds\" his issues by not taking his medications and using THC to cope with his depression. Patient was able to identify his \"roots\" of trauma and how he has dealt with his PTSD in the past. He was open to feedback about what needed to change in his \"dirt\" and how he wanted his recovery to look moving forward.      Matthew Yanez

## 2020-12-02 NOTE — PROGRESS NOTES
Problem: Falls - Risk of  Goal: *Absence of Falls  Description: Document Albino Messing Fall Risk and appropriate interventions in the flowsheet. Outcome: Progressing Towards Goal  Note: Fall Risk Interventions:  Mobility Interventions: Assess mobility with egress test         Medication Interventions: Teach patient to arise slowly    Elimination Interventions:  Toilet paper/wipes in reach    History of Falls Interventions: Door open when patient unattended

## 2020-12-02 NOTE — BH NOTES
Specific patient behavior that led to need for PRN medication: nxiety, Sleep    Staff interventions attempted prior to PRN being given: Meditation  PRN medication given: Atarax 50mg PO given @ 2054, Trazodone 50mg given @ 2054  Patient response/effectiveness of PRN medication: 2138 decrease in anxiety

## 2020-12-03 VITALS
WEIGHT: 242.51 LBS | HEART RATE: 67 BPM | OXYGEN SATURATION: 100 % | BODY MASS INDEX: 41.4 KG/M2 | RESPIRATION RATE: 16 BRPM | DIASTOLIC BLOOD PRESSURE: 92 MMHG | TEMPERATURE: 97.7 F | SYSTOLIC BLOOD PRESSURE: 147 MMHG | HEIGHT: 64 IN

## 2020-12-03 PROCEDURE — 74011250637 HC RX REV CODE- 250/637: Performed by: PSYCHIATRY & NEUROLOGY

## 2020-12-03 PROCEDURE — 74011250637 HC RX REV CODE- 250/637: Performed by: NURSE PRACTITIONER

## 2020-12-03 PROCEDURE — 90686 IIV4 VACC NO PRSV 0.5 ML IM: CPT | Performed by: PSYCHIATRY & NEUROLOGY

## 2020-12-03 PROCEDURE — 74011250636 HC RX REV CODE- 250/636: Performed by: PSYCHIATRY & NEUROLOGY

## 2020-12-03 PROCEDURE — 90471 IMMUNIZATION ADMIN: CPT

## 2020-12-03 RX ORDER — TRAZODONE HYDROCHLORIDE 50 MG/1
50 TABLET ORAL
Qty: 30 TAB | Refills: 0 | Status: SHIPPED | OUTPATIENT
Start: 2020-12-03

## 2020-12-03 RX ORDER — DICYCLOMINE HYDROCHLORIDE 10 MG/1
10 CAPSULE ORAL
Qty: 60 CAP | Refills: 0 | Status: SHIPPED | OUTPATIENT
Start: 2020-12-03

## 2020-12-03 RX ORDER — HYDROXYZINE 50 MG/1
50 TABLET, FILM COATED ORAL
Qty: 90 TAB | Refills: 0 | Status: SHIPPED | OUTPATIENT
Start: 2020-12-03

## 2020-12-03 RX ORDER — LITHIUM CARBONATE 300 MG/1
300 TABLET, FILM COATED, EXTENDED RELEASE ORAL 3 TIMES DAILY
Qty: 90 TAB | Refills: 0 | Status: SHIPPED | OUTPATIENT
Start: 2020-12-03

## 2020-12-03 RX ORDER — DIVALPROEX SODIUM 250 MG/1
1750 TABLET, EXTENDED RELEASE ORAL
Qty: 210 TAB | Refills: 0 | Status: SHIPPED | OUTPATIENT
Start: 2020-12-03

## 2020-12-03 RX ADMIN — INFLUENZA VIRUS VACCINE 0.5 ML: 15; 15; 15; 15 SUSPENSION INTRAMUSCULAR at 12:58

## 2020-12-03 RX ADMIN — DICYCLOMINE HYDROCHLORIDE 10 MG: 10 CAPSULE ORAL at 12:58

## 2020-12-03 RX ADMIN — FLUTICASONE PROPIONATE 2 SPRAY: 50 SPRAY, METERED NASAL at 09:36

## 2020-12-03 RX ADMIN — PANTOPRAZOLE SODIUM 40 MG: 40 TABLET, DELAYED RELEASE ORAL at 09:36

## 2020-12-03 RX ADMIN — METOPROLOL TARTRATE 50 MG: 25 TABLET, FILM COATED ORAL at 09:36

## 2020-12-03 RX ADMIN — LITHIUM CARBONATE 300 MG: 300 TABLET, EXTENDED RELEASE ORAL at 16:26

## 2020-12-03 RX ADMIN — LITHIUM CARBONATE 300 MG: 300 TABLET, EXTENDED RELEASE ORAL at 09:36

## 2020-12-03 NOTE — BH NOTES
PRN Medication Documentation    Specific patient behavior that led to need for PRN medication: Sleeplessness and anxiety  Staff interventions attempted prior to PRN being given: education  PRN medication given: Trazodone and Atarax  Patient response/effectiveness of PRN medication: will monitor

## 2020-12-03 NOTE — PROGRESS NOTES
Problem: Falls - Risk of  Goal: *Absence of Falls  Description: Document Daria Painting Fall Risk and appropriate interventions in the flowsheet. Outcome: Progressing Towards Goal  Note: Fall Risk Interventions:  Mobility Interventions: Assess mobility with egress test   Patient has remained fall free. Wearing gripper socks. Medication Interventions: Teach patient to arise slowly    Elimination Interventions: Toilet paper/wipes in reach    History of Falls Interventions: Door open when patient unattended         Problem: Depressed Mood (Adult/Pediatric)  Goal: *STG: Verbalizes anger, guilt, and other feelings in a constructive manor  Outcome: Progressing Towards Goal    Elevated mood. Affect bright. Animated at times.

## 2020-12-03 NOTE — PROGRESS NOTES
Problem: Falls - Risk of  Goal: *Absence of Falls  Description: Document Veatrice Marker Fall Risk and appropriate interventions in the flowsheet. Outcome: Progressing Towards Goal  Note: Fall Risk Interventions:  Mobility Interventions: Assess mobility with egress test         Medication Interventions: Teach patient to arise slowly    Elimination Interventions:  Toilet paper/wipes in reach    History of Falls Interventions: Door open when patient unattended

## 2020-12-03 NOTE — INTERDISCIPLINARY ROUNDS
Behavioral Health Interdisciplinary Rounds     Patient Name: Isis Cuadra  Age: 28 y.o. Room/Bed:  3/  Primary Diagnosis: <principal problem not specified>   Admission Status: Voluntary     Readmission within 30 days: no  Power of  in place: no  Patient requires a blocked bed:  Reason for blocked bed:   Sleep hours: 6       Participation in Care/Groups:  yes  Medication Compliant?: Yes  PRNS (last 24 hours):  Antianxiety, Sleep Aid and bentyl    Restraints (last 24 hours):  no     Alcohol screening (AUDIT) completed -  AUDIT Score: 2  If applicable, date SBIRT discussed in treatment team AND documented:    Tobacco - patient is a smoker: Have You Used Tobacco in the Past 30 Days: No  Illegal Drugs use: Have You Used Any Illegal Substances Over the Past 12 Months: Yes    24 hour chart check complete:     Patient goal(s) for today:   Treatment team focus/goals:   Progress note   Family Contact information:   Patient's preferred phone number for follow up call :  Patient's preferred e-mail address :  LOS:  8  Expected LOS:     Participating treatment team members: Isis Cuadra, * (assigned SW),

## 2020-12-03 NOTE — DISCHARGE INSTRUCTIONS
DISCHARGE SUMMARY    NAME:Hima Peterson  : 1985  MRN: [id number]    The patient Maurine Brunner exhibits the ability to control behavior in a less restrictive environment. Patient's level of functioning is improving. No assaultive/destructive behavior has been observed for the past 24 hours. No suicidal/homicidal threat or behavior has been observed for the past 24 hours. There is no evidence of serious medication side effects. Patient has not been in physical or protective restraints for at least the past 24 hours. If weapons involved, how are they secured? NA    Is patient aware of and in agreement with discharge plan? Yes    Arrangements for medication:  Prescriptions sent to pt pharmacy    Copy of discharge instructions to provider?:  [address] for transportation home:  [place] all follow up appointments as scheduled, continue to take prescribed medications per physician instructions. Mental health crisis number:  393 or your local mental health crisis line number at [address]    A suicide Safety Plan is a document that supports someone when they are having thoughts of suicide. Warning Signs that indicate a suicidal crisis may be developing: What (situations, thoughts, feelings, body sensations, behaviors, etc.) do you experience that lets you know you are beginning to think about suicide? 1. Suicidal thoughts with a specific plan  2. Cutting or an increase in self harming behaviors      Internal Coping Strategies:  What things can I do (relaxation techniques, hobbies, physical activities, etc.) to take my mind off my problems without contacting another person? 1. netflix  2. music      People and social settings that provide distraction: Who can I call or where can I go to distract me? 1.  Name:neighbors and friends                                      2. Place: neighbors or friends houses           People whom I can ask for help: Who can I call when I need help - for example, friends, family, clergy, someone else? 1. Neighbors and friends    Professionals or [address] [place], [place] [address]vd I can contact during a crisis: Who can I call for help - for example, my doctor, my psychiatrist, my psychologist, a mental health provider, a suicide hotline? 1. Clinician Name: Dr. Amos Moore  [phone]                                        2. Clinician Name: Shivam Agustin:   [phone]                               3. Suicide Prevention Lifeline: 3-258-961-TALK (5011)  4. [address] [place] [address]  [phone]     Making the environment safe: How can I make my environment (house/apartment/living space) safer? For example, can I remove guns, medications, and other items? 1. Medications organized in weekly pill box and all excess medications removed from home  2. Share your safety plan with your support system and both you and them have your local crisis number and [phone] programmed into your phones      DISCHARGE SUMMARY from Nurse    PATIENT INSTRUCTIONS:      What to do at Home:  Recommended activity: Activity as tolerated,       *  Please give a list of your current medications to your Primary Care Provider. *  Please update this list whenever your medications are discontinued, doses are      changed, or new medications (including over-the-counter products) are added. *  Please carry medication information at all times in case of emergency situations. These are general instructions for a healthy lifestyle:    No smoking/ No tobacco products/ Avoid exposure to second hand smoke  Surgeon General's Warning:  Quitting smoking now greatly reduces serious risk to your health.     Obesity, smoking, and sedentary lifestyle greatly increases your risk for illness    A healthy diet, regular physical exercise & weight monitoring are important for maintaining a healthy lifestyle    You may be retaining fluid if you have a history of heart failure or if you experience any of the following symptoms:  Weight gain of 3 pounds or more overnight or 5 pounds in a week, increased swelling in our hands or feet or shortness of breath while lying flat in bed. Please call your doctor as soon as you notice any of these symptoms; do not wait until your next office visit. The discharge information has been reviewed with the patient. The patient verbalized understanding. Discharge medications reviewed with the patient and appropriate educational materials and side effects teaching were provided.   ___________________________________________________________________________________________________________________________________

## 2020-12-03 NOTE — BH NOTES
PRN Medication Documentation    Specific patient behavior that led to need for PRN medication: Pt complains of abdominal cramping. Staff interventions attempted prior to PRN being given: Offered warm compress. Used distraction by talking about positive coping mechanisms. PRN medication given: Bentyl given  PO  Patient response/effectiveness of PRN medication: Will continue to monitor. 1700  No further complaints. Pain reassessed.

## 2020-12-03 NOTE — DISCHARGE SUMMARY
DISCHARGE SUMMARY    Some parts of the discharge summary are from the initial Psychiatric interview that was done on admission by the admitting psychiatrist.      Date of Admission: 11/25/2020    Date of Discharge:12/3/2020     TYPE OF DISCHARGE:   REGULAR -  YES    AMA  RELEASED BY THE [facility] COURT     CHIEF COMPLAINT:  \"I was feeling very suicidal and hopeless. \"     HISTORY OF PRESENT ILLNESS:  The patient is a 27-year-old male who is transgender female to male and is currently admitted with a history of being transferred to us from [facility].  He had been brought there with a history of having expressed thoughts of suicide and cutting himself. Reports that he was just discharged from [facility] a few months ago, but had never gotten better post discharge. States that he has been sleeping poorly, has had little energy and motivation and thinks about suicide almost every day. Since his thoughts were getting worse, he decided to come to the hospital for help. Denies use of alcohol, but reports that he uses marijuana most days of the week and estimates that he uses about a gram per day, which he has been doing for many years. States that he has been compliant with most of his medications, but felt that they are really not helping him. He states that he broke up with a girlfriend in 02/2020, after which COVID started and he has felt stressed ever since. He also noted that she has been harassing him recently with multiple phone calls, and this has made him feel worse. He has a long history of cutting himself, but says he has not done that for the past 2 weeks. Most recently, he was supposed to see somebody at the [facility] and has been compliant in following up there.   He denies any psychotic symptoms at the present time.     PAST MEDICAL HISTORY:  Reviewed as per the history and physical exam.             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 Authorizing Provider   atorvastatin (LIPITOR) 40 mg tablet Take 1 Tab by mouth nightly. Indications: high cholesterol and high triglycerides 11/28/20   Yes Rupesh Cordon NP   tiZANidine (ZANAFLEX) 2 mg tablet Take 2 mg by mouth three (3) times daily. Indications: muscle spasm     Yes Provider, Historical   divalproex DR (DEPAKOTE) 500 mg tablet Take 500 mg by mouth two (2) times a day. Indications: bipolar disorder in remission 11/4/20   Yes Provider, Historical   pantoprazole (PROTONIX) 40 mg tablet Take 40 mg by mouth daily. Indications: gastroesophageal reflux disease     Yes Provider, Historical   OLANZapine (ZyPREXA) 10 mg tablet Take 10 mg by mouth every twelve (12) hours as needed. Indications: severe agitation secondary to mood/psychotic disorder     Yes Provider, Historical   hydrOXYzine HCL (ATARAX) 50 mg tablet Take 50 mg by mouth three (3) times daily as needed for Anxiety.     Yes Provider, Historical   dicyclomine (BENTYL) 10 mg capsule Take 10 mg by mouth four (4) times daily. Indications: irritable colon     Yes Provider, Historical   metoprolol tartrate (LOPRESSOR) 25 mg tablet Take 25 mg by mouth two (2) times a day. Indications: high blood pressure     Yes Provider, Historical   albuterol (ProAir HFA) 90 mcg/actuation inhaler Take 2 Puffs by inhalation every four (4) hours as needed for Wheezing.     Yes Provider, Historical   fluticasone propionate (FLONASE) 50 mcg/actuation nasal spray 2 Sprays by Both Nostrils route daily. Indications: allergic conjunctivitis     Yes Provider, Historical   testosterone cypionate (DEPOTESTOTERONE CYPIONATE) 200 mg/mL injection 100 mg.  Subcutaneously every Wednesday     Yes Provider, Historical   mometasone (ELOCON) 0.1 % topical cream Apply  to affected area daily.     Yes Provider, Historical             Vitals:     11/30/20 2008 12/01/20 0810 12/01/20 1541 12/01/20 2015   BP: (!) 133/90 (!) 167/77 (!) 137/91 135/70   Pulse: 75 92 74 73   Resp: 16 18 14 12   Temp: 98 °F (36.7 °C) 97.6 °F (36.4 °C) 97.3 °F (36.3 °C) 97.9 °F (36.6 °C)   SpO2:     94% 95%   Weight:           Height:                    Lab Results   Component Value Date/Time     WBC 5.7 05/13/2009 07:30 PM     HGB 15.7 05/13/2009 07:30 PM     HCT 44.4 05/13/2009 07:30 PM     PLATELET 102 94/47/6158 07:30 PM     MCV 88.8 05/13/2009 07:30 PM            Lab Results   Component Value Date/Time     Sodium 142 12/02/2020 06:00 AM     Potassium 3.7 12/02/2020 06:00 AM     Chloride 106 12/02/2020 06:00 AM     CO2 31 12/02/2020 06:00 AM     Anion gap 5 12/02/2020 06:00 AM     Glucose 84 12/02/2020 06:00 AM     BUN 8 12/02/2020 06:00 AM     Creatinine 0.66 (L) 12/02/2020 06:00 AM     BUN/Creatinine ratio 12 12/02/2020 06:00 AM     GFR est AA >60 12/02/2020 06:00 AM     GFR est non-AA >60 12/02/2020 06:00 AM     Calcium 8.9 12/02/2020 06:00 AM     Bilirubin, total 0.3 05/13/2009 07:30 PM     Alk. phosphatase 63 05/13/2009 07:30 PM     Protein, total 7.0 05/13/2009 07:30 PM     Albumin 4.0 05/13/2009 07:30 PM     Globulin 3.0 05/13/2009 07:30 PM     A-G Ratio 1.3 05/13/2009 07:30 PM     ALT (SGPT) 42 05/13/2009 07:30 PM     AST (SGOT) 7 (L) 05/13/2009 07:30 PM            Lab Results   Component Value Date/Time     Valproic acid 125 (H) 11/29/2020 05:49 PM            Lab Results   Component Value Date/Time     Lithium level 0.44 (L) 12/01/2020 06:14 AM      RADIOLOGY REPORTS:(reviewed/updated 12/2/2020)  No results found.     PAST PSYCHIATRIC HISTORY:  The patient reports that he was first hospitalized at the age of 15 and has had numerous psychiatric hospitalizations since then.   As noted above, he was most recently hospitalized at the [facility] 4 months ago, but feels that stay was not really beneficial to him. Currently, he sees Dr. Jared Wheeler at the [facility] and also has a therapist whom he sees on a regular basis. The patient was hospitalized in 2009 at [facility] for major depression and had been treated by Dr. Breann Stoll at that time. Currently, the patient is going through the transition from female to male and has had several surgeries as well as being on testosterone injections.     PSYCHOSOCIAL HISTORY:  He lives in [place], [place], where he lives alone. He has never been  and does not have any children and is currently single. The patient gets social security disability for his current psychiatric diagnosis and does not have an income otherwise. States that he has no contact with his parents and only keeps in touch with his grandparents who live in [place]. He was born and brought up in [place] for the most part. Denies any major legal or financial stressors.     MENTAL STATUS EXAM:  The patient is a young male who is dressed in casual street clothes. He is calm and cooperative during the interview and makes good eye contact. Speech is spontaneous and coherent. Mood is reported as being down, and affect is depressed. Psychomotor activity is within normal limits. Passive suicidal ideation is present but feels safe in the hospital.  Denies any perceptual abnormalities. Denies any delusions. His thought process is logical and goal-directed. Cognitively, he is awake and alert, oriented to time, place and person. Intelligence is average, memory is intact, and fund of knowledge is adequate. Insight is poor. Judgment is poor.     ASSESSMENT AND PLAN/DIAGNOSES:  Bipolar I disorder, most recent episode depression, without psychotic symptoms. Borderline personality disorder.   History of obsessive-compulsive disorder.     At the present time, I will continue his inpatient stay. He will be provided with support and attend groups. Estimated length of stay is 5-7 days. His strengths include his ability to seek help and support from his therapeutic providers and grandparents.       Course in the Hospital:     Patient was admitted to the inpatient psychiatry unit for acute psychiatric stabilization in regards to symptomatology as described in the HPI above and placed on Q15 minute checks and withdrawal precautions. While on the unit Darcie Last was involved in individual, group, occupational and milieu therapy. He was started back on his usual medication regimen as well as PRN medications including Lithium, Depakote, Trazodone, Vistaril for anxiety. Dosages of Lithium were adjusted based on his levels as appropriate. He improved gradually and was able to integrate into the milieu with help from the nursing staff. Patients symptoms improved gradually including Si, labile moods, poor sleep, low energy, depressed moods. He was quite on the unit, appropriate in his interactions, and cooperative with medications and the unit routine. Please see individual progress notes for more specific details regarding patient's hospitalization course. Patient was discharged as per the plan. He had been doing well on the unit as per the report of the nursing staff and my observations. No PRN medication for agitation, seclusion or restraints were required during the last 48 hours of her stay. Darcie Last had improved progressively to the point of being stable for discharge and outpatient FU. At this time he did not offer any complaints. Patient denied any SI or HI. Denied any AH or VH. He denied any delusions. Was not considered a danger to self or to others and is safe for discharge. Will FU with his appointments and remains motivated to be in treatment. The patient verbalized understanding of his discharge instructions.         DISCHARGE DIAGNOSIS:  Bipolar I disorder, most recent episode depression, without psychotic symptoms. Borderline personality disorder. History of obsessive-compulsive disorder. MENTAL STATUS EXAM ON DISCHARGE:    General appearance:   Zenaida Ibrahim is a 28 y.o. WHITE OR  male who is well groomed, psychomotor activity is WNL  Eye contact: makes good eye contact  Speech: Spontaneous and coherent  Affect : Euthymic  Mood: \"OK\"  Thought Process: Logical, goal directed  Perception: Denies any AH or VH. Thought Content: Denies any SI or Plan  Insight: Partial  Judgement: Fair  Cognition: Intact grossly. Current Discharge Medication List      START taking these medications    Details   divalproex ER (DEPAKOTE ER) 250 mg ER tablet Take 7 Tabs by mouth nightly. Indications: bipolar I disorder with most recent episode mixed  Qty: 210 Tab, Refills: 0      lithium carbonate SR (LITHOBID) 300 mg CR tablet Take 1 Tab by mouth three (3) times daily. Indications: nilsa associated with bipolar disorder  Qty: 90 Tab, Refills: 0      traZODone (DESYREL) 50 mg tablet Take 1 Tab by mouth nightly as needed for Sleep (For insomnia). Indications: insomnia associated with depression  Qty: 30 Tab, Refills: 0      atorvastatin (LIPITOR) 40 mg tablet Take 1 Tab by mouth nightly. Indications: high cholesterol and high triglycerides  Qty: 30 Tab, Refills: 0         CONTINUE these medications which have CHANGED    Details   dicyclomine (BENTYL) 10 mg capsule Take 1 Cap by mouth every six (6) hours as needed for Abdominal Cramps. Indications: irritable colon  Qty: 60 Cap, Refills: 0      hydrOXYzine HCL (ATARAX) 50 mg tablet Take 1 Tab by mouth three (3) times daily as needed for Anxiety. Indications: anxious  Qty: 90 Tab, Refills: 0         CONTINUE these medications which have NOT CHANGED    Details   pantoprazole (PROTONIX) 40 mg tablet Take 40 mg by mouth daily.  Indications: gastroesophageal reflux disease      OLANZapine (ZyPREXA) 10 mg tablet Take 10 mg by mouth every twelve (12) hours as needed. Indications: severe agitation secondary to mood/psychotic disorder      metoprolol tartrate (LOPRESSOR) 25 mg tablet Take 25 mg by mouth two (2) times a day. Indications: high blood pressure      albuterol (ProAir HFA) 90 mcg/actuation inhaler Take 2 Puffs by inhalation every four (4) hours as needed for Wheezing. fluticasone propionate (FLONASE) 50 mcg/actuation nasal spray 2 Sprays by Both Nostrils route daily. Indications: allergic conjunctivitis      testosterone cypionate (DEPOTESTOTERONE CYPIONATE) 200 mg/mL injection 100 mg. Subcutaneously every Wednesday         STOP taking these medications       tiZANidine (ZANAFLEX) 2 mg tablet Comments:   Reason for Stopping:         divalproex DR (DEPAKOTE) 500 mg tablet Comments:   Reason for Stopping:         mometasone (ELOCON) 0.1 % topical cream Comments:   Reason for Stopping:              Lab Results   Component Value Date/Time    Valproic acid 125 (H) 11/29/2020 05:49 PM     Lab Results   Component Value Date/Time    Lithium level 0.44 (L) 12/01/2020 06:14 AM     Follow-up Information     Follow up With Specialties Details Why [address] [facility]  On 12/15/2020 [facility] APPT @ 9:30am  Follow-up for Medication Mgmt Dr. Preeti Roach  [phone]  FAX: [phone] [facility]  On 12/8/2020 [facility] APPT @ 1:00pm Follow-up for [facility]  [phone]  FAX: [phone]    UNKNOWN            WOUND CARE: none needed. PROGNOSIS:   Good / Fair based on nature of patient's pathology/ies and treatment compliance issues. Prognosis is greatly dependent upon patient's ability to  follow up on psychiatric/psychotherapy appointments as well as to comply with psychiatric medications as prescribed.

## 2020-12-03 NOTE — PROGRESS NOTES
Pharmacist Discharge Medication Reconciliation    Discharging Provider: Dr. 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 Allergies: Amoxicillin; Geodon [ziprasidone hcl]; Haloperidol; Lamictal [lamotrigine]; Remeron [mirtazapine]; Risperidone; and Spironolactone    Discharge Medications:   Current Discharge Medication List        START taking these medications    Details   divalproex ER (DEPAKOTE ER) 250 mg ER tablet Take 7 Tabs by mouth nightly. Indications: bipolar I disorder with most recent episode mixed  Qty: 210 Tab, Refills: 0      lithium carbonate SR (LITHOBID) 300 mg CR tablet Take 1 Tab by mouth three (3) times daily. Indications: nilsa associated with bipolar disorder  Qty: 90 Tab, Refills: 0      traZODone (DESYREL) 50 mg tablet Take 1 Tab by mouth nightly as needed for Sleep (For insomnia). Indications: insomnia associated with depression  Qty: 30 Tab, Refills: 0      atorvastatin (LIPITOR) 40 mg tablet Take 1 Tab by mouth nightly. Indications: high cholesterol and high triglycerides  Qty: 30 Tab, Refills: 0           CONTINUE these medications which have CHANGED    Details   dicyclomine (BENTYL) 10 mg capsule Take 1 Cap by mouth every six (6) hours as needed for Abdominal Cramps. Indications: irritable colon  Qty: 60 Cap, Refills: 0      hydrOXYzine HCL (ATARAX) 50 mg tablet Take 1 Tab by mouth three (3) times daily as needed for Anxiety. Indications: anxious  Qty: 90 Tab, Refills: 0           CONTINUE these medications which have NOT CHANGED    Details   pantoprazole (PROTONIX) 40 mg tablet Take 40 mg by mouth daily.  Indications: gastroesophageal reflux disease      OLANZapine (ZyPREXA) 10 mg tablet Take 10 mg by mouth every twelve (12) hours as needed. Indications: severe agitation secondary to mood/psychotic disorder      metoprolol tartrate (LOPRESSOR) 25 mg tablet Take 25 mg by mouth two (2) times a day. Indications: high blood pressure      albuterol (ProAir HFA) 90 mcg/actuation inhaler Take 2 Puffs by inhalation every four (4) hours as needed for Wheezing. fluticasone propionate (FLONASE) 50 mcg/actuation nasal spray 2 Sprays by Both Nostrils route daily. Indications: allergic conjunctivitis      testosterone cypionate (DEPOTESTOTERONE CYPIONATE) 200 mg/mL injection 100 mg. Subcutaneously every Wednesday           STOP taking these medications       tiZANidine (ZANAFLEX) 2 mg tablet Comments:   Reason for Stopping:         divalproex DR (DEPAKOTE) 500 mg tablet Comments:   Reason for Stopping:         mometasone (ELOCON) 0.1 % topical cream Comments:   Reason for Stopping:             Updated information:  - divalproex was changed #3 tabs of 500mg QHS + #1 tab of 250mg QHS  - scripts were sent to [facility] outpatient pharmacy due to complications with the patient getting his medications today.  [facility] was called and scripts canceled    The patient's chart, MAR and AVS were reviewed by Grace Tobias PHARMD.

## 2020-12-03 NOTE — GROUP NOTE
SELENA  GROUP DOCUMENTATION INDIVIDUAL                                                                          Group Therapy Note    Date: 12/3/2020    Group Start Time: 1000  Group End Time: 1200  Group Topic: Discharge Planning    [facility] 7W GEN BEHAV TH    Vania Griggs    IP [address] [facility] GROUP DOCUMENTATION GROUP    Group Therapy Note    Attendees: 8         Attendance: Did not attend    Patient's Goal:      Interventions/techniques: Follows Directions:     Interactions:     Mental Status:     Behavior/appearance:     Goals Achieved:        Additional Notes:      Hope Walter

## 2020-12-03 NOTE — PROGRESS NOTES
Problem: Depressed Mood (Adult/Pediatric)  Goal: *STG: Participates in treatment plan  Outcome: Progressing Towards Goal  Note: Out on unit for meals and meds, declined group. Mood/attitude negative to euthymic. Denies SI w plan. Fleeing SI persist however pt states he is safe.  Daily goal is to d/c staff focus is on d/c planning  Goal: *STG: Verbalizes anger, guilt, and other feelings in a constructive manor  Outcome: Progressing Towards Goal  Goal: *STG: Attends activities and groups  Outcome: Progressing Towards Goal  Goal: *STG: Demonstrates reduction in symptoms and increase in insight into coping skills/future focused  Outcome: Progressing Towards Goal  Goal: Interventions  Outcome: Progressing Towards Goal

## 2020-12-04 NOTE — BH NOTES
Behavioral Health Transition Record to Provider    Patient Name: Maurine Brunner  YOB: 1985  Medical Record Number: [id number]  Date of Admission: 11/25/2020  Date of Discharge: 12/3/20    Attending Provider: No att. providers found  Discharging Provider: Dr. Annie Loja  To contact this individual call [phone] and ask the  to page. If unavailable, ask to be transferred to [address] [place] [address] Provider on call. [facility] Provider will be available on call 24/7 and during holidays. Primary Care Provider: UNKNOWN    Allergies   Allergen Reactions    Amoxicillin Unknown (comments)    Geodon [Ziprasidone Hcl] Unknown (comments)    Haloperidol Unknown (comments)    Lamictal [Lamotrigine] Unknown (comments)    Remeron [Mirtazapine] Unknown (comments)    Risperidone Unknown (comments)    Spironolactone Unknown (comments)       Reason for Admission: Pt is a 29yo, transgender male who was transferred from [facility] endorsing SI and thoughts of self-harm by cutting.     Admission Diagnosis: MDD (major depressive disorder) [F32.9]    * No surgery found *    Results for orders placed or performed during the hospital encounter of 11/25/20   CULTURE, URINE    Specimen: Clean catch; Urine    URINE   Result Value Ref Range    Special Requests: NO SPECIAL REQUESTS      Culture result: No growth (<1,000 CFU/ML)     T4, FREE   Result Value Ref Range    T4, Free 1.2 0.8 - 1.5 NG/DL   TSH 3RD GENERATION   Result Value Ref Range    TSH 1.53 0.36 - 3.74 uIU/mL   URINALYSIS W/MICROSCOPIC   Result Value Ref Range    Color YELLOW/STRAW      Appearance CLEAR CLEAR      Specific gravity 1.023 1.003 - 1.030      pH (UA) 7.0 5.0 - 8.0      Protein Negative NEG mg/dL    Glucose Negative NEG mg/dL    Ketone TRACE (A) NEG mg/dL    Bilirubin Negative NEG      Blood Negative NEG      Urobilinogen 1.0 0.2 - 1.0 EU/dL    Nitrites Negative NEG      Leukocyte Esterase Negative NEG      WBC 0-4 0 - 4 /hpf RBC 0-5 0 - 5 /hpf    Epithelial cells FEW FEW /lpf    Bacteria Negative NEG /hpf    Hyaline cast 0-2 0 - 5 /lpf   HEMOGLOBIN A1C WITH EAG   Result Value Ref Range    Hemoglobin A1c 5.5 4.0 - 5.6 %    Est. average glucose 111 mg/dL   LIPID PANEL   Result Value Ref Range    LIPID PROFILE          Cholesterol, total 262 (H) <200 MG/DL    Triglyceride 483 (H) <150 MG/DL    HDL Cholesterol 31 MG/DL    LDL, calculated Not calculated due to elevated triglyceride level 0 - 100 MG/DL    VLDL, calculated  MG/DL     Calculation not valid with this patient's other Lipid values.     CHOL/HDL Ratio 8.5 (H) 0.0 - 5.0     LDL, DIRECT   Result Value Ref Range    LDL,Direct 155 (H) 0 - 100 mg/dl   VALPROIC ACID   Result Value Ref Range    Valproic acid 125 (H) 50 - 100 ug/ml   LITHIUM   Result Value Ref Range    Lithium level 0.44 (L) 0.60 - 1.20 MMOL/L    Reported dose date NOT PROVIDED      Reported dose time: NOT PROVIDED      Reported dose: NOT PROVIDED UNITS   METABOLIC PANEL, BASIC   Result Value Ref Range    Sodium 142 136 - 145 mmol/L    Potassium 3.7 3.5 - 5.1 mmol/L    Chloride 106 97 - 108 mmol/L    CO2 31 21 - 32 mmol/L    Anion gap 5 5 - 15 mmol/L    Glucose 84 65 - 100 mg/dL    BUN 8 6 - 20 MG/DL    Creatinine 0.66 (L) 0.70 - 1.30 MG/DL    BUN/Creatinine ratio 12 12 - 20      GFR est AA >60 >60 ml/min/1.73m2    GFR est non-AA >60 >60 ml/min/1.73m2    Calcium 8.9 8.5 - 10.1 MG/DL   EKG, 12 LEAD, INITIAL   Result Value Ref Range    Ventricular Rate 60 BPM    Atrial Rate 60 BPM    P-R Interval 150 ms    QRS Duration 98 ms    Q-T Interval 406 ms    QTC Calculation (Bezet) 406 ms    Calculated P Axis 13 degrees    Calculated R Axis 7 degrees    Calculated T Axis 5 degrees    Diagnosis       Normal sinus rhythm  J point elevation  No previous ECGs available  Confirmed by Sandra Mercer MD, Karla Muller ([id number]) on 11/28/2020 12:02:30 AM     ECHO ADULT COMPLETE   Result Value Ref Range    IVSd 1.08 (A) 0.6 - 1.0 cm    LVIDd 4.20 4.2 - 5.9 cm    LVIDs 2.80 cm    LVPWd 1.43 (A) 0.6 - 1.0 cm    LVOT Peak Gradient 3.73 mmHg    LVOT Peak Velocity 96.62 cm/s    RVIDd 3.34 cm    Left Atrium Major Axis 4.02 cm    AoV PG 6.26 mmHg    Aortic Valve Systolic Peak Velocity 783.52 cm/s    MV A Tl 66.84 cm/s    Mitral Valve E Wave Deceleration Time 149.87 ms    MV E Tl 94.11 cm/s    Mitral Valve Pressure Half-time 43.46 ms    MVA (PHT) 5.06 cm2    Pulmonic Valve Systolic Peak Instantaneous Gradient 3.50 mmHg    Tapse 1.99 1.5 - 2.0 cm    Ao Root D 2.87 cm    MV E/A 1.41     LV Mass .3 88 - 224 g    LV Mass AL Index 89.6 49 - 115 g/m2    Left Atrium Minor Axis 1.89 cm       Immunizations administered during this encounter:   Immunization History   Administered Date(s) Administered    Influenza Vaccine [place]) PF (>6 Mo Flulaval, Fluarix, and >3 Yrs [place], Fluzone [address]78[address]) 12/03/2020       Screening for Metabolic Disorders for Patients on Antipsychotic Medications  (Data obtained from the EMR)    Estimated Body Mass Index  Estimated body mass index is 41.63 kg/m² as calculated from the following:    Height as of this encounter: 5' 4\" (1.626 m). Weight as of this encounter: 110 kg (242 lb 8.1 oz).      Vital Signs/Blood Pressure  Visit Vitals  BP (!) 147/92   Pulse 67   Temp 97.7 °F (36.5 °C)   Resp 16   Ht 5' 4\" (1.626 m)   Wt 110 kg (242 lb 8.1 oz)   SpO2 100%   BMI 41.63 kg/m²       Blood Glucose/Hemoglobin A1c  Lab Results   Component Value Date/Time    Glucose 84 12/02/2020 06:00 AM       Lab Results   Component Value Date/Time    Hemoglobin A1c 5.5 11/27/2020 04:57 PM        Lipid Panel  Lab Results   Component Value Date/Time    Cholesterol, total 262 (H) 11/27/2020 04:57 PM    HDL Cholesterol 31 11/27/2020 04:57 PM    LDL,Direct 155 (H) 11/27/2020 04:57 PM    LDL, calculated Not calculated due to elevated triglyceride level 11/27/2020 04:57 PM    Triglyceride 483 (H) 11/27/2020 04:57 PM    CHOL/HDL Ratio 8.5 (H) 11/27/2020 04:57 PM        Discharge Diagnosis: Bipolar I disorder, most recent episode depression, without psychotic symptoms.  Borderline personality disorder.  History of obsessive-compulsive disorder. Discharge Plan: The patient Estela Showers exhibits the ability to control behavior in a less restrictive environment. Patient's level of functioning is improving. No assaultive/destructive behavior has been observed for the past 24 hours. No suicidal/homicidal threat or behavior has been observed for the past 24 hours. There is no evidence of serious medication side effects. Patient has not been in physical or protective restraints for at least the past 24 hours. If weapons involved, how are they secured? NA    Is patient aware of and in agreement with discharge plan? Yes    Arrangements for medication:  Prescriptions sent to pt pharmacy    Copy of discharge instructions to provider?:  [address] for transportation home:  [place] all follow up appointments as scheduled, continue to take prescribed medications per physician instructions. Mental health crisis number:  882 or your local mental health crisis line number at [address]    A suicide Safety Plan is a document that supports someone when they are having thoughts of suicide. Warning Signs that indicate a suicidal crisis may be developing: What (situations, thoughts, feelings, body sensations, behaviors, etc.) do you experience that lets you know you are beginning to think about suicide? 1. Suicidal thoughts with a specific plan  2. Cutting or an increase in self harming behaviors      Internal Coping Strategies:  What things can I do (relaxation techniques, hobbies, physical activities, etc.) to take my mind off my problems without contacting another person? 1. netflix  2. music      People and social settings that provide distraction: Who can I call or where can I go to distract me? 1.  Name:neighbors and friends 2. Place: neighbors or friends houses           People whom I can ask for help: Who can I call when I need help - for example, friends, family, clergy, someone else? 1. Neighbors and friends    Professionals or [address] [place], [place] [address]vd I can contact during a crisis: Who can I call for help - for example, my doctor, my psychiatrist, my psychologist, a mental health provider, a suicide hotline? 1. Clinician Name: Dr. Berto Jimenez  [phone]                                        2. Clinician Name: Ljsusy Rios:   [phone]                               3. Suicide Prevention Lifeline: 2-673-009-TALK (0951)  4. [address]  [phone]     Making the environment safe: How can I make my environment (house/apartment/living space) safer? For example, can I remove guns, medications, and other items? 1. Medications organized in weekly pill box and all excess medications removed from home  2. Share your safety plan with your support system and both you and them have your local crisis number and [phone] programmed into your phones      Discharge Medication List and Instructions:   Discharge Medication List as of 12/3/2020  4:02 PM      START taking these medications    Details   divalproex ER (DEPAKOTE ER) 250 mg ER tablet Take 7 Tabs by mouth nightly. Indications: bipolar I disorder with most recent episode mixed, Normal, Disp-210 Tab,R-0      lithium carbonate SR (LITHOBID) 300 mg CR tablet Take 1 Tab by mouth three (3) times daily. Indications: nilsa associated with bipolar disorder, Normal, Disp-90 Tab,R-0      traZODone (DESYREL) 50 mg tablet Take 1 Tab by mouth nightly as needed for Sleep (For insomnia). Indications: insomnia associated with depression, Normal, Disp-30 Tab,R-0      atorvastatin (LIPITOR) 40 mg tablet Take 1 Tab by mouth nightly.  Indications: high cholesterol and high triglycerides, Normal, Disp-30 Tab,R-0 CONTINUE these medications which have CHANGED    Details   dicyclomine (BENTYL) 10 mg capsule Take 1 Cap by mouth every six (6) hours as needed for Abdominal Cramps. Indications: irritable colon, Normal, Disp-60 Cap,R-0      hydrOXYzine HCL (ATARAX) 50 mg tablet Take 1 Tab by mouth three (3) times daily as needed for Anxiety. Indications: anxious, Normal, Disp-90 Tab,R-0         CONTINUE these medications which have NOT CHANGED    Details   pantoprazole (PROTONIX) 40 mg tablet Take 40 mg by mouth daily. Indications: gastroesophageal reflux disease, Historical Med      OLANZapine (ZyPREXA) 10 mg tablet Take 10 mg by mouth every twelve (12) hours as needed. Indications: severe agitation secondary to mood/psychotic disorder, Historical Med      metoprolol tartrate (LOPRESSOR) 25 mg tablet Take 25 mg by mouth two (2) times a day. Indications: high blood pressure, Historical Med      albuterol (ProAir HFA) 90 mcg/actuation inhaler Take 2 Puffs by inhalation every four (4) hours as needed for Wheezing., Historical Med      fluticasone propionate (FLONASE) 50 mcg/actuation nasal spray 2 Sprays by Both Nostrils route daily. Indications: allergic conjunctivitis, Historical Med      testosterone cypionate (DEPOTESTOTERONE CYPIONATE) 200 mg/mL injection 100 mg.  Subcutaneously every Wednesday, Historical Med         STOP taking these medications       tiZANidine (ZANAFLEX) 2 mg tablet Comments:   Reason for Stopping:         divalproex DR (DEPAKOTE) 500 mg tablet Comments:   Reason for Stopping:         mometasone (ELOCON) 0.1 % topical cream Comments:   Reason for Stopping:               Unresulted Labs (24h ago, onward)    None        To obtain results of studies pending at discharge, please contact [phone]    Follow-up Information     Follow up With Specialties Details Why [address]  On 12/15/2020 [facility] APPT @ 9:30am  Follow-up for Medication Mgmt Dr. Ya Grossman  [phone]  FAX: [address]  On 12/8/2020 [facility] APPT @ 1:00pm Follow-up for Counseling Services Kendraapril Mathews  [phone]  FAX: [phone]    UNKNOWN              Advanced Directive:   Does the patient have an appointed surrogate decision maker? No  Does the patient have a Medical Advance Directive? No  Does the patient have a Psychiatric Advance Directive? No  If the patient does not have a surrogate or Medical Advance Directive AND Psychiatric Advance Directive, the patient was offered information on these advance directives Patient declined to complete    Patient Instructions: Please continue all medications until otherwise directed by physician. Tobacco Cessation Discharge Plan:   Is the patient a smoker and needs referral for smoking cessation? No  Patient referred to the following for smoking cessation with an appointment? Not applicable     Patient was offered medication to assist with smoking cessation at discharge? Not applicable  Was education for smoking cessation added to the discharge instructions? Not applicable    Alcohol/Substance Abuse Discharge Plan:   Does the patient have a history of substance/alcohol abuse and requires a referral for treatment? No  Patient referred to the following for substance/alcohol abuse treatment with an appointment? Not applicable  Patient was offered medication to assist with alcohol cessation at discharge? No  Was education for substance/alcohol abuse added to discharge instructions? Yes    Patient discharged to Home; discussed with patient/caregiver and provided to the patient/caregiver either in hard copy or electronically.

## 2020-12-09 LAB
ANION GAP SERPL CALC-SCNC: 5 MMOL/L (ref 5–15)
BUN SERPL-MCNC: 8 MG/DL (ref 6–20)
BUN/CREAT SERPL: 12 (ref 12–20)
CALCIUM SERPL-MCNC: 8.9 MG/DL (ref 8.5–10.1)
CHLORIDE SERPL-SCNC: 106 MMOL/L (ref 97–108)
CO2 SERPL-SCNC: 31 MMOL/L (ref 21–32)
CREAT SERPL-MCNC: 0.66 MG/DL (ref 0.55–1.02)
GLUCOSE SERPL-MCNC: 84 MG/DL (ref 65–100)
POTASSIUM SERPL-SCNC: 3.7 MMOL/L (ref 3.5–5.1)
SODIUM SERPL-SCNC: 142 MMOL/L (ref 136–145)

## 2022-03-19 PROBLEM — F32.9 MDD (MAJOR DEPRESSIVE DISORDER): Status: ACTIVE | Noted: 2020-11-25

## 2023-05-30 RX ORDER — TESTOSTERONE CYPIONATE 200 MG/ML
INJECTION, SOLUTION INTRAMUSCULAR
COMMUNITY

## 2023-05-30 RX ORDER — DIVALPROEX SODIUM 250 MG/1
TABLET, EXTENDED RELEASE ORAL
COMMUNITY
Start: 2020-12-03

## 2023-05-30 RX ORDER — LITHIUM CARBONATE 300 MG/1
TABLET, FILM COATED, EXTENDED RELEASE ORAL 3 TIMES DAILY
COMMUNITY
Start: 2020-12-03

## 2023-05-30 RX ORDER — ALBUTEROL SULFATE 90 UG/1
2 AEROSOL, METERED RESPIRATORY (INHALATION) EVERY 4 HOURS PRN
COMMUNITY

## 2023-05-30 RX ORDER — ATORVASTATIN CALCIUM 40 MG/1
TABLET, FILM COATED ORAL
COMMUNITY
Start: 2020-11-28

## 2023-05-30 RX ORDER — FLUTICASONE PROPIONATE 50 MCG
2 SPRAY, SUSPENSION (ML) NASAL DAILY
COMMUNITY

## 2023-05-30 RX ORDER — OLANZAPINE 10 MG/1
TABLET ORAL
COMMUNITY

## 2023-05-30 RX ORDER — TRAZODONE HYDROCHLORIDE 50 MG/1
TABLET ORAL
COMMUNITY
Start: 2020-12-03

## 2023-05-30 RX ORDER — HYDROXYZINE 50 MG/1
TABLET, FILM COATED ORAL 3 TIMES DAILY PRN
COMMUNITY
Start: 2020-12-03

## 2023-05-30 RX ORDER — PANTOPRAZOLE SODIUM 40 MG/1
TABLET, DELAYED RELEASE ORAL DAILY
COMMUNITY

## 2023-05-30 RX ORDER — DICYCLOMINE HYDROCHLORIDE 10 MG/1
CAPSULE ORAL EVERY 6 HOURS PRN
COMMUNITY
Start: 2020-12-03

## 2024-11-26 NOTE — BH NOTES
GROUP THERAPY PROGRESS NOTE    Patient is participating in Meditation Group. Group time: 50 minutes    Personal goal for participation: Alleviate stress and anxiety meditation    Goal orientation: Personal    Group therapy participation: active    Therapeutic interventions reviewed and discussed: Group members participated in a progressive muscle relaxation exercise. They listened to a recording, and felt tension in different parts of their body. Then they followed this up with a meditation session for anxiety. The goal is to relax the body and mind and think about situations that are stressors in an individuals life. Group discussion was about patients current feelings after their exercise. Impression of participation: Patient reported having a difficult time with meditation but was encouraged to attend group. He did, but read his book during group. Calm and cooperative.      Nora Hernandez, Supervisee in Social Work Vaginal Delivery